# Patient Record
Sex: FEMALE | Race: WHITE | Employment: FULL TIME | URBAN - METROPOLITAN AREA
[De-identification: names, ages, dates, MRNs, and addresses within clinical notes are randomized per-mention and may not be internally consistent; named-entity substitution may affect disease eponyms.]

---

## 2020-11-18 ENCOUNTER — VIRTUAL VISIT (OUTPATIENT)
Dept: TRANSPLANT | Facility: CLINIC | Age: 60
End: 2020-11-18
Attending: GENETIC COUNSELOR, MS
Payer: COMMERCIAL

## 2020-11-18 DIAGNOSIS — Z84.81 FAMILY HISTORY OF GENETIC DISEASE CARRIER: Primary | ICD-10-CM

## 2020-11-18 PROCEDURE — 96040 HC GENETIC COUNSELING, EACH 30 MINUTES: CPT | Mod: GT | Performed by: GENETIC COUNSELOR, MS

## 2020-11-18 NOTE — PROGRESS NOTES
"Gricelda Levine is a 60 year old female who is being evaluated via a billable telephone visit.      The patient has been notified of following:     \"This telephone visit will be conducted via a call between you and your physician/provider. We have found that certain health care needs can be provided without the need for a physical exam.  This service lets us provide the care you need with a short phone conversation.  If a prescription is necessary we can send it directly to your pharmacy.  If lab work is needed we can place an order for that and you can then stop by our lab to have the test done at a later time.    Telephone visits are billed at different rates depending on your insurance coverage. During this emergency period, for some insurers they may be billed the same as an in-person visit.  Please reach out to your insurance provider with any questions.    If during the course of the call the physician/provider feels a telephone visit is not appropriate, you will not be charged for this service.\"    Patient has given verbal consent for Telephone visit?  YES    Phone call duration: 75 minutes    It was a pleasure meeting with Gricelda IrvinRachel) on 2020  in the [facility] to review the family history of X-linked adrenoleukodystrophy (X-ALD). I was accompanied to this visit by Samera Al-Najjar, genetic counseling intern.     Pertinent History: Gricelda's daughter, Angie, has a son, Joshua, who had a positive  screen for X-ALD. He had subsequent testing that included positive VLCFA studies and a variant of possible clinical significance (VUS) in the ABCD1 gene called c.1448C>T (p.[id number]). Angie was subsequently found to have the famililal variant and Gricelda's , Davis, by report had normal VLCFA studies. Based on this information, Gricelda has presented for genetic counseling to consider testing. Gricelda denied any symptoms associated with X-ALD.    X-ALD Overview: "     X-ALD is a genetic condition that can present with a variety of symptoms in different individuals affected with the disease. The most common forms of X-ALD include: (1) pre-symptomatic/asymptomatic, (2) cerebral disease, (3) adrenal insufficiency (Brazoria's disease), and (4) myelopathy/AMN.    Pre-symptomatic/asymptomatic: Some individuals who do not currently have symptoms are known to have X-ALD following a positive  screen or predictive genetic testing. These individuals are considered pre-symptomatic because they do not currently have symptoms.    Cerebral disease: This form is most common in boys between 3-12 years of age although cerebral disease can develop at other times in childhood or adulthood. Symptoms often begin with learning and behavioral challenges that worsen over time. Seizures can be the first symptom in some boys. Common symptoms as the disease progresses include vision & hearing problems, difficulties swallowing, and poor coordination. Untreated, individuals with the cerebral form of ALD typically develop worsening symptoms that eventually lead to full disability and death. Screening for early signs of cerebral disease with brain MRIs can help detect early signs of disease when more treatment options are available.     Adrenal insufficiency (Brazoria's disease): This form results from the adrenal glands not making enough of certain hormones. Some symptoms of adrenal insufficiency include weakness, weight loss, darkening patches of skin, vomiting, and coma. Adrenal insufficiency most commonly presents in childhood but can develop later in life. Boys and men with X-ALD are monitored for adrenal dysfunction so they can be treated if adrenal insufficiency develops.    Myelopathy/AMN: This form typically presents in adulthood and involves weakness and stiffness in the lower extremities (paraparesis). Other symptoms can include difficulties walking (gait disorder), bladder and bowel control  "issues, and sexual dysfunction. This form is sometimes referred to as adrenomyeloneuropathy (AMN).    Males with X-ALD generally develop one or multiple of these forms of X-ALD in their lifetime. Females with X-ALD (historically called \"carriers\") are more likely to have either no symptoms or develop myelopathy/AMN-like symptoms in adulthood (typically >30 years of age). Since cerebral disease and adrenal insufficiency are rare in females with X-ALD, girls and women with X-ALD are not routinely screened for these symptoms. Individuals with X-ALD, even in the same family, can have very different forms of X-ALD. Genetic testing, VLCFA studies, and family history cannot predict how someone with X-ALD will present in their lifetime.    X-ALD Genetics:     We all have DNA in every cell in our bodies that tells our bodies how to develop and function properly. Individual instructions in the DNA are called genes. Disease-causing variants (changes) in genes that stop the gene from working properly cause genetic diseases like X-ALD.    DNA is stored in structures called chromosomes. We all have 46 chromosomes that come in 23 pairs. The first 22 pairs of chromosomes are called autosomal and are the same in males and females. The 23rd pair of chromosomes are called sex chromosomes and are different in males and females. Male sex chromosomes are XY while female sex chromosomes are XX.     The gene associated with X-ALD is called ABCD1 which is located on the X chromosome. Normally, this gene provides the instructions for building a protein called adrenoleukodystrophy protein (ALDP). This protein helps to transport very long-chain fatty acids (VLCFA) into the peroxisomes. Peroxisomes are a part of the cell that breaks down and processes many things including VLCFA.     Disease-causing changes in the ABCD1 gene leads to ALDP not working properly. When ALDP doesn't work like it should, VLCFA aren't moved into the peroxisomes to be " broken down. This causes high levels of VLCFA in the body. This build up of VLCFA appears to harm different parts of the body including: (1) the coating called myelin that protects the nerves in the brain and spine and (2) the adrenal glands that are responsible for making certain hormones in the body.    X-ALD Inheritance:     Because women have two copies of the X chromosome, they have two copies of the ABCD1 gene. Men only have one X chromosome so men only have one copy of the ABCD1 gene. As a result, if one copy of a woman's ABCD1 gene has a change, she is most often either unaffected or has myelopathy/AMN-like symptoms associated with X-ALD. This is because she has another copy of the ABCD1 gene on the other X chromosome that still works properly.  On the other hand, almost all boys and men with X-ALD from a change in the ABCD1 gene develop some symptoms because they do not have an extra copy.       For males with X-ALD, they will pass the ABCD1 gene variant to all of their daughters and none of their sons.     For females with X-ALD, there is a 50% chance of passing on the ABCD1 gene variant in each pregnancy. Any son has a 50% chance of inheriting the ABCD1 gene variant and any daughter has a 50% chance of inheriting the ABCD1 gene variant.  o Based on the currently interpretation of the variant, our understanding of how this disease will present clinically for individuals in the family who inherit the familial variant is currently limited.    Consent for Genetic Testing:  After reviewing the risks, benefits, limitations, and potential for genetic discrimination associated with genetic testing, Gricelda elected to proceed with targeted testing for the familial variant in the ABCD1 gene through the [facility]. The sample will be drawn after insurance has been evaluated on Gricelda's behalf. Gricelda will be contacted by our insurance team to determine if she is comfortable  with her level of coverage. If Gricelda is comfortable with her level of coverage, testing will be initiated and Gricelda will be contacted with the results of her testing.     Testing for Family Members:  Gricelda gave permission for her medical and family history information to be shared with any of her extended family. She is hopeful that her results will help inform familial testing. We reviewed how, if she is found to have the familial variant, testing will be recommended for her extended family. If she is not found to have the variant, her extended family like her siblings/parents/neices and nephews would not need to complete testing. In this scenario, her children could still have a small chance of having the familial variant due to potential germline mosaicism and testing would still be offered to her children/grandchildren. Additional testing could also be completed on Gricelda's  to confirm that he does not have the familial variant as needed. The family is working to send a copy of Davis's VLCFA results for our records.    Plan:  1. The family history was reviewed today.  2. The genetics and inheritance of X-ALD were reviewed.  3. After reviewing the risks, benefits, limitations, and potential for genetic discrimination, Gricelda verbally consented to targeted ABCD1 gene testing through the [facility]. Insurance will be evaluated on her behalf and Gricelda will be contacted with the outcome of this review to determine if she would like to proceed with testing. Results will be provided over the phone.  5. A verbal release of information was completed for family communication of Gricelda's PHI and for email communication of PHI.  6. Contact information was provided. Additional questions or concerns were denied.     Sincerely,    Paola Bustamante MS, MA, [facility]  Licensed, Certified Genetic Counselor  Pediatric Blood & Marrow Transplant  [phone]  [email or web address]    Approximate time spent in consultation: 75 minutes

## 2020-11-18 NOTE — LETTER
"2020      RE: Gricelad Levine  [address] [address] [facility] [id number]       Gricelda Levine is a 60 year old female who is being evaluated via a billable telephone visit.      The patient has been notified of following:     \"This telephone visit will be conducted via a call between you and your physician/provider. We have found that certain health care needs can be provided without the need for a physical exam.  This service lets us provide the care you need with a short phone conversation.  If a prescription is necessary we can send it directly to your pharmacy.  If lab work is needed we can place an order for that and you can then stop by our lab to have the test done at a later time.    Telephone visits are billed at different rates depending on your insurance coverage. During this emergency period, for some insurers they may be billed the same as an in-person visit.  Please reach out to your insurance provider with any questions.    If during the course of the call the physician/provider feels a telephone visit is not appropriate, you will not be charged for this service.\"    Patient has given verbal consent for Telephone visit?  YES    Phone call duration: 75 minutes    It was a pleasure meeting with Gricelda Fonseca) on 2020  in the [facility] to review the family history of X-linked adrenoleukodystrophy (X-ALD). I was accompanied to this visit by Samera Al-Najjar, genetic counseling intern.     Pertinent History: Gricelda's daughter, Angie, has a son, Joshua, who had a positive  screen for X-ALD. He had subsequent testing that included positive VLCFA studies and a variant of possible clinical significance (VUS) in the ABCD1 gene called c.1448C>T (p.[id number]). Angie was subsequently found to have the famililal variant and Gricelda's , Davis, by report had normal VLCFA studies. Based on this information, Gricelda has presented for genetic counseling to " consider testing. Gricelda denied any symptoms associated with X-ALD.    X-ALD Overview:     X-ALD is a genetic condition that can present with a variety of symptoms in different individuals affected with the disease. The most common forms of X-ALD include: (1) pre-symptomatic/asymptomatic, (2) cerebral disease, (3) adrenal insufficiency ([place]ct[place]'s disease), and (4) myelopathy/AMN.    Pre-symptomatic/asymptomatic: Some individuals who do not currently have symptoms are known to have X-ALD following a positive  screen or predictive genetic testing. These individuals are considered pre-symptomatic because they do not currently have symptoms.    Cerebral disease: This form is most common in boys between 3-12 years of age although cerebral disease can develop at other times in childhood or adulthood. Symptoms often begin with learning and behavioral challenges that worsen over time. Seizures can be the first symptom in some boys. Common symptoms as the disease progresses include vision & hearing problems, difficulties swallowing, and poor coordination. Untreated, individuals with the cerebral form of ALD typically develop worsening symptoms that eventually lead to full disability and death. Screening for early signs of cerebral disease with brain MRIs can help detect early signs of disease when more treatment options are available.     Adrenal insufficiency (Oliver's disease): This form results from the adrenal glands not making enough of certain hormones. Some symptoms of adrenal insufficiency include weakness, weight loss, darkening patches of skin, vomiting, and coma. Adrenal insufficiency most commonly presents in childhood but can develop later in life. Boys and men with X-ALD are monitored for adrenal dysfunction so they can be treated if adrenal insufficiency develops.    Myelopathy/AMN: This form typically presents in adulthood and involves weakness and stiffness in the lower extremities (paraparesis).  "Other symptoms can include difficulties walking (gait disorder), bladder and bowel control issues, and sexual dysfunction. This form is sometimes referred to as adrenomyeloneuropathy (AMN).    Males with X-ALD generally develop one or multiple of these forms of X-ALD in their lifetime. Females with X-ALD (historically called \"carriers\") are more likely to have either no symptoms or develop myelopathy/AMN-like symptoms in adulthood (typically >30 years of age). Since cerebral disease and adrenal insufficiency are rare in females with X-ALD, girls and women with X-ALD are not routinely screened for these symptoms. Individuals with X-ALD, even in the same family, can have very different forms of X-ALD. Genetic testing, VLCFA studies, and family history cannot predict how someone with X-ALD will present in their lifetime.    X-ALD Genetics:     We all have DNA in every cell in our bodies that tells our bodies how to develop and function properly. Individual instructions in the DNA are called genes. Disease-causing variants (changes) in genes that stop the gene from working properly cause genetic diseases like X-ALD.    DNA is stored in structures called chromosomes. We all have 46 chromosomes that come in 23 pairs. The first 22 pairs of chromosomes are called autosomal and are the same in males and females. The 23rd pair of chromosomes are called sex chromosomes and are different in males and females. Male sex chromosomes are XY while female sex chromosomes are XX.     The gene associated with X-ALD is called ABCD1 which is located on the X chromosome. Normally, this gene provides the instructions for building a protein called adrenoleukodystrophy protein (ALDP). This protein helps to transport very long-chain fatty acids (VLCFA) into the peroxisomes. Peroxisomes are a part of the cell that breaks down and processes many things including VLCFA.     Disease-causing changes in the ABCD1 gene leads to ALDP not working " properly. When ALDP doesn't work like it should, VLCFA aren't moved into the peroxisomes to be broken down. This causes high levels of VLCFA in the body. This build up of VLCFA appears to harm different parts of the body including: (1) the coating called myelin that protects the nerves in the brain and spine and (2) the adrenal glands that are responsible for making certain hormones in the body.    X-ALD Inheritance:     Because women have two copies of the X chromosome, they have two copies of the ABCD1 gene. Men only have one X chromosome so men only have one copy of the ABCD1 gene. As a result, if one copy of a woman's ABCD1 gene has a change, she is most often either unaffected or has myelopathy/AMN-like symptoms associated with X-ALD. This is because she has another copy of the ABCD1 gene on the other X chromosome that still works properly.  On the other hand, almost all boys and men with X-ALD from a change in the ABCD1 gene develop some symptoms because they do not have an extra copy.       For males with X-ALD, they will pass the ABCD1 gene variant to all of their daughters and none of their sons.     For females with X-ALD, there is a 50% chance of passing on the ABCD1 gene variant in each pregnancy. Any son has a 50% chance of inheriting the ABCD1 gene variant and any daughter has a 50% chance of inheriting the ABCD1 gene variant.  o Based on the currently interpretation of the variant, our understanding of how this disease will present clinically for individuals in the family who inherit the familial variant is currently limited.    Consent for Genetic Testing:  After reviewing the risks, benefits, limitations, and potential for genetic discrimination associated with genetic testing, Gricelda elected to proceed with targeted testing for the familial variant in the ABCD1 gene through the [facility]. The sample will be drawn after insurance has been evaluated on  Gricelda's behalf. Gricelda will be contacted by our insurance team to determine if she is comfortable with her level of coverage. If Gricelda is comfortable with her level of coverage, testing will be initiated and Gricelda will be contacted with the results of her testing.     Testing for Family Members:  Gricelda gave permission for her medical and family history information to be shared with any of her extended family. She is hopeful that her results will help inform familial testing. We reviewed how, if she is found to have the familial variant, testing will be recommended for her extended family. If she is not found to have the variant, her extended family like her siblings/parents/neices and nephews would not need to complete testing. In this scenario, her children could still have a small chance of having the familial variant due to potential germline mosaicism and testing would still be offered to her children/grandchildren. Additional testing could also be completed on Gricelda's  to confirm that he does not have the familial variant as needed. The family is working to send a copy of Davis's VLCFA results for our records.    Plan:  1. The family history was reviewed today.  2. The genetics and inheritance of X-ALD were reviewed.  3. After reviewing the risks, benefits, limitations, and potential for genetic discrimination, Gricelda verbally consented to targeted ABCD1 gene testing through the [facility]. Insurance will be evaluated on her behalf and Gricelda will be contacted with the outcome of this review to determine if she would like to proceed with testing. Results will be provided over the phone.  5. A verbal release of information was completed for family communication of Gricelda's PHI and for email communication of PHI.  6. Contact information was provided. Additional questions or concerns were denied.     Sincerely,    Paola Bustamante MS, MA, [facility]  Licensed, Certified  Genetic Counselor  Pediatric Blood & Marrow Transplant  [phone]  [email or web address]    Approximate time spent in consultation: 75 minutes

## 2020-11-30 ENCOUNTER — TELEPHONE (OUTPATIENT)
Dept: CONSULT | Facility: CLINIC | Age: 60
End: 2020-11-30

## 2020-11-30 NOTE — TELEPHONE ENCOUNTER
I called Gricelda to provide options on where she can get drawn for her genetic testing. Gricelda will schedule an appointment at the [facility]. We will call when results are available.       FRAN Wilcox   Genomics Billing   [facility]  [facility]  [address] [place] [id number]  [email or web address]   Office: [phone]  Fax: [phone]

## 2020-12-10 DIAGNOSIS — Z84.81 FAMILY HISTORY OF GENETIC DISEASE CARRIER: ICD-10-CM

## 2020-12-10 PROCEDURE — 99N1104 PR STATISTIC DNA ISOL HIGH PURITY: Performed by: PEDIATRICS

## 2020-12-11 DIAGNOSIS — Z84.81 FAMILY HISTORY OF GENETIC DISEASE CARRIER: Primary | ICD-10-CM

## 2020-12-11 LAB — COPATH REPORT: NORMAL

## 2020-12-11 PROCEDURE — 81405 MOPATH PROCEDURE LEVEL 6: CPT | Performed by: PEDIATRICS

## 2020-12-11 PROCEDURE — G0452 MOLECULAR PATHOLOGY INTERPR: HCPCS | Mod: 26 | Performed by: PATHOLOGY

## 2020-12-11 PROCEDURE — 81479 UNLISTED MOLECULAR PATHOLOGY: CPT | Performed by: PEDIATRICS

## 2020-12-18 ENCOUNTER — TELEPHONE (OUTPATIENT)
Dept: TRANSPLANT | Facility: CLINIC | Age: 60
End: 2020-12-18

## 2020-12-18 LAB — COPATH REPORT: NORMAL

## 2020-12-18 NOTE — Clinical Note
Attn: HIMS  Please print and send a copy of this letter and result to the patient at the home address.  Thank you!  Paola

## 2020-12-18 NOTE — LETTER
2020       TO: Gricelda Levine  [id number] [facility] [address]         Dear Gricelda,    It was a pleasure speaking with you on 2020 regarding the results of your genetic studies. Below is a summary of our discussion for your records:    Pertinent Family History: Previously, your grandson had a positive [place]  screen for X-ALD. Additional studies including VLCFA studies and ABCD1 gene testing were most consistent with your grandson having X-ALD. At your last visit, you elected to proceed with targeted testing for the familial ABCD1 gene variant.     ABCD1 Gene Testing:  Genetic testing targeting the familial ABCD1 gene variant called c.1448C>T (p.Csl935Qcy) was sent to the [facility]. The results were as follows:    RESULTS: Positive. 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    Gricelda, you were found to have the familial c.1448C>T (p.Dkc800Sgb) variant (change) in the ABCD1 gene. 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 Additional testing and symptom evaluation in family members may help increase the laboratory's confidence in the interpretation of this variant. It is important to reach out annually for any updates on this variant.    X-ALD Overview:   X-ALD is a genetic condition that can present with a variety of symptoms in different individuals affected with the disease. The most common forms of X-ALD include: (1) pre-symptomatic/asymptomatic, (2) cerebral disease, (3) adrenal insufficiency (Dawson's disease), and (4) myelopathy/AMN.    Pre-symptomatic/asymptomatic: Some individuals who do not currently have symptoms are known to have X-ALD following a positive   "screen or predictive genetic testing. These individuals are considered pre-symptomatic because they do not currently have symptoms.    Cerebral disease: This form is most common in boys between 3-12 years of age although cerebral disease can develop at other times in childhood or adulthood. Symptoms often begin with learning and behavioral challenges that worsen over time. Seizures can be the first symptom in some boys. Common symptoms as the disease progresses include vision & hearing problems, difficulties swallowing, and poor coordination. Untreated, individuals with the cerebral form of ALD typically develop worsening symptoms that eventually lead to full disability and death. Screening for early signs of cerebral disease with brain MRIs can help detect early signs of disease when more treatment options are available.     Adrenal insufficiency (Craighead's disease): This form results from the adrenal glands not making enough of certain hormones. Some symptoms of adrenal insufficiency include weakness, weight loss, darkening patches of skin, vomiting, and coma. Adrenal insufficiency most commonly presents in childhood but can develop later in life. Boys and men with X-ALD are monitored for adrenal dysfunction so they can be treated if adrenal insufficiency develops.    Myelopathy/AMN: This form typically presents in adulthood and involves weakness and stiffness in the lower extremities (paraparesis). Other symptoms can include difficulties walking (gait disorder), bladder and bowel control issues, and sexual dysfunction. This form is sometimes referred to as adrenomyeloneuropathy (AMN).    Males with X-ALD generally develop one or multiple of these forms of X-ALD in their lifetime. Females with X-ALD (historically called \"carriers\") are more likely to have either no symptoms or develop myelopathy/AMN-like symptoms in adulthood (typically >30 years of age). Since cerebral disease and adrenal insufficiency are rare " in females with X-ALD, girls and women with X-ALD are not routinely screened for these symptoms. Individuals with X-ALD, even in the same family, can have very different forms of X-ALD. Genetic testing, VLCFA studies, and family history cannot predict how someone with X-ALD will present in their lifetime.    X-ALD Genetics:  We all have DNA in every cell in our bodies that tells our bodies how to develop and function properly. Individual instructions in the DNA are called genes. Disease-causing variants (changes) in genes that stop the gene from working properly cause genetic diseases like X-ALD.    DNA is stored in structures called chromosomes. We all have 46 chromosomes that come in 23 pairs. The first 22 pairs of chromosomes are called autosomal and are the same in males and females. The 23rd pair of chromosomes are called sex chromosomes and are different in males and females. Male sex chromosomes are XY while female sex chromosomes are XX.     The gene associated with X-ALD is called ABCD1 which is located on the X chromosome. Normally, this gene provides the instructions for building a protein called adrenoleukodystrophy protein (ALDP). This protein helps to transport very long-chain fatty acids (VLCFA) into the peroxisomes. Peroxisomes are a part of the cell that breaks down and processes many things including VLCFA.     Disease-causing changes in the ABCD1 gene leads to ALDP not working properly. When ALDP doesn't work like it should, VLCFA aren't moved into the peroxisomes to be broken down. This causes high levels of VLCFA in the body. This build up of VLCFA appears to harm different parts of the body including: (1) the coating called myelin that protects the nerves in the brain and spine and (2) the adrenal glands that are responsible for making certain hormones in the body.    X-ALD Inheritance:   Because women have two copies of the X chromosome, they have two copies of the ABCD1 gene. Men only have  one X chromosome so men only have one copy of the ABCD1 gene. As a result, if one copy of a woman's ABCD1 gene has a change, she is most often either unaffected or has myelopathy/AMN-like symptoms associated with X-ALD. This is because she has another copy of the ABCD1 gene on the other X chromosome that still works properly. On the other hand, almost all boys and men with X-ALD from a change in the ABCD1 gene develop some symptoms because they do not have an extra copy.       For males with X-ALD, they will pass the ABCD1 gene variant to all of their daughters and none of their sons.     For females with X-ALD, there is a 50% chance of passing on the ABCD1 gene variant in each pregnancy. Any son has a 50% chance of inheriting the ABCD1 gene variant and any daughter has a 50% chance of inheriting the ABCD1 gene variant.  o Since your testing was positive for the ABCD1 gene variant that was found in your grandson and his mother, this is the chance your other children would have X-ALD. Based on the interpretation of the familial variant, our understanding of how this disease will present clinically for individuals in the family who inherit the familial variant is currently limited.    Testing for Family Members:   We also reviewed testing for other family members. Males in the family can be tested via VLCFA analysis. Up to 20% of women with X-ALD will have a false negative result from VLCFA studies. Because of this, female relatives of individuals with X-ALD are generally  advised to complete testing for the specific ABCD1 gene variant that has been identified in the family. Female relatives can complete testing for the familial variant that was initially identified through your grandson's testing. A genetic counselor can help facilitate this testing. Relatives can find a local genetic counselor through the [employer] website. Based on the genetic findings in the family, genetic counseling is highly  "recommended for relatives pursuing genetic testing so that they can discuss the current limitations to our understanding of the familial variant. An updated family letter was provided to assist with familial testing.    It was a pleasure speaking with you regarding these results. Please find a copy of your test results enclosed for your records. The above information is based on our current understanding of the genetic findings in your family. You are encouraged to reach out annually for any updates to your family's genetic testing information as our understanding of the genetic findings in your family may change over time. If you have any additional questions or concerns, please do not hesitate to call me at [phone].    Sincerely,    Paola Bustamante MS, MA, [facility]  Licensed, Certified Genetic Counselor  Pediatric Blood & Marrow Transplant  [phone]  [email or web address]    ENCLOSURE: Please include a copy of Gricelda's results under the \"Laboratory\" tab titled \"next generation sequencing\" from 12/11/2020.                                              "

## 2021-03-18 ENCOUNTER — IMMUNIZATION (OUTPATIENT)
Dept: NURSING | Facility: CLINIC | Age: 61
End: 2021-03-18
Payer: COMMERCIAL

## 2021-03-18 PROCEDURE — 91300 PR COVID VAC PFIZER DIL RECON 30 MCG/0.3 ML IM: CPT

## 2021-03-18 PROCEDURE — 0001A PR COVID VAC PFIZER DIL RECON 30 MCG/0.3 ML IM: CPT

## 2021-04-08 ENCOUNTER — IMMUNIZATION (OUTPATIENT)
Dept: NURSING | Facility: CLINIC | Age: 61
End: 2021-04-08
Attending: FAMILY MEDICINE
Payer: COMMERCIAL

## 2021-04-08 PROCEDURE — 91300 PR COVID VAC PFIZER DIL RECON 30 MCG/0.3 ML IM: CPT

## 2021-04-08 PROCEDURE — 0002A PR COVID VAC PFIZER DIL RECON 30 MCG/0.3 ML IM: CPT

## 2021-05-01 ENCOUNTER — HEALTH MAINTENANCE LETTER (OUTPATIENT)
Age: 61
End: 2021-05-01

## 2021-10-11 ENCOUNTER — HEALTH MAINTENANCE LETTER (OUTPATIENT)
Age: 61
End: 2021-10-11

## 2022-05-22 ENCOUNTER — HEALTH MAINTENANCE LETTER (OUTPATIENT)
Age: 62
End: 2022-05-22

## 2022-09-24 ENCOUNTER — HEALTH MAINTENANCE LETTER (OUTPATIENT)
Age: 62
End: 2022-09-24

## 2023-01-29 ENCOUNTER — HEALTH MAINTENANCE LETTER (OUTPATIENT)
Age: 63
End: 2023-01-29

## 2023-06-04 ENCOUNTER — HEALTH MAINTENANCE LETTER (OUTPATIENT)
Age: 63
End: 2023-06-04

## 2024-04-16 ENCOUNTER — TRANSFERRED RECORDS (OUTPATIENT)
Dept: MULTI SPECIALTY CLINIC | Facility: CLINIC | Age: 64
End: 2024-04-16

## 2024-04-16 LAB
CREATININE (EXTERNAL): 0.81 MG/DL (ref 0.55–1.02)
GFR ESTIMATED (EXTERNAL): >60 ML/MIN/1.73M2
GLUCOSE (EXTERNAL): 110 MG/DL (ref 70–100)
HBA1C MFR BLD: 5.6 %
POTASSIUM (EXTERNAL): 4.2 MMOL/L (ref 3.5–5.1)

## 2024-04-17 RX ORDER — NAPROXEN SODIUM 220 MG/1
220 TABLET, FILM COATED ORAL 2 TIMES DAILY WITH MEALS
Status: ON HOLD | COMMUNITY
End: 2024-05-04

## 2024-04-17 RX ORDER — MULTIVITAMIN
1 TABLET ORAL DAILY
COMMUNITY

## 2024-04-17 RX ORDER — ACETAMINOPHEN 500 MG
500-1000 TABLET ORAL EVERY 6 HOURS PRN
COMMUNITY

## 2024-04-30 RX ORDER — IBUPROFEN 400 MG/1
400 TABLET, FILM COATED ORAL EVERY 6 HOURS PRN
Status: ON HOLD | COMMUNITY
End: 2024-05-04

## 2024-05-03 ENCOUNTER — ANESTHESIA (OUTPATIENT)
Dept: SURGERY | Facility: CLINIC | Age: 64
End: 2024-05-03
Payer: COMMERCIAL

## 2024-05-03 ENCOUNTER — APPOINTMENT (OUTPATIENT)
Dept: RADIOLOGY | Facility: CLINIC | Age: 64
End: 2024-05-03
Attending: ORTHOPAEDIC SURGERY
Payer: COMMERCIAL

## 2024-05-03 ENCOUNTER — ANCILLARY PROCEDURE (OUTPATIENT)
Dept: ULTRASOUND IMAGING | Facility: CLINIC | Age: 64
End: 2024-05-03
Attending: ANESTHESIOLOGY
Payer: COMMERCIAL

## 2024-05-03 ENCOUNTER — HOSPITAL ENCOUNTER (INPATIENT)
Facility: CLINIC | Age: 64
LOS: 2 days | Discharge: HOME | End: 2024-05-05
Attending: ORTHOPAEDIC SURGERY | Admitting: ORTHOPAEDIC SURGERY
Payer: COMMERCIAL

## 2024-05-03 ENCOUNTER — ANESTHESIA EVENT (OUTPATIENT)
Dept: SURGERY | Facility: CLINIC | Age: 64
End: 2024-05-03
Payer: COMMERCIAL

## 2024-05-03 DIAGNOSIS — Z98.1 S/P LUMBAR AND LUMBOSACRAL FUSION BY ANTERIOR TECHNIQUE: Primary | ICD-10-CM

## 2024-05-03 PROCEDURE — 272N000004 HC RX 272: Performed by: ORTHOPAEDIC SURGERY

## 2024-05-03 PROCEDURE — 0SG30A0 FUSION OF LUMBOSACRAL JOINT WITH INTERBODY FUSION DEVICE, ANTERIOR APPROACH, ANTERIOR COLUMN, OPEN APPROACH: ICD-10-PCS | Performed by: ORTHOPAEDIC SURGERY

## 2024-05-03 PROCEDURE — 360N000086 HC SURGERY LEVEL 6 W/ FLUORO, PER MIN: Performed by: ORTHOPAEDIC SURGERY

## 2024-05-03 PROCEDURE — 250N000025 HC SEVOFLURANE, PER MIN: Performed by: ORTHOPAEDIC SURGERY

## 2024-05-03 PROCEDURE — 272N000001 HC OR GENERAL SUPPLY STERILE: Performed by: ORTHOPAEDIC SURGERY

## 2024-05-03 PROCEDURE — 8E0WXBZ COMPUTER ASSISTED PROCEDURE OF TRUNK REGION: ICD-10-PCS | Performed by: ORTHOPAEDIC SURGERY

## 2024-05-03 PROCEDURE — 250N000013 HC RX MED GY IP 250 OP 250 PS 637: Performed by: ORTHOPAEDIC SURGERY

## 2024-05-03 PROCEDURE — 0ST40ZZ RESECTION OF LUMBOSACRAL DISC, OPEN APPROACH: ICD-10-PCS | Performed by: ORTHOPAEDIC SURGERY

## 2024-05-03 PROCEDURE — 999N000141 HC STATISTIC PRE-PROCEDURE NURSING ASSESSMENT: Performed by: ORTHOPAEDIC SURGERY

## 2024-05-03 PROCEDURE — C1762 CONN TISS, HUMAN(INC FASCIA): HCPCS | Performed by: ORTHOPAEDIC SURGERY

## 2024-05-03 PROCEDURE — 999N000182 XR SURGERY CARM FLUORO GREATER THAN 5 MIN: Mod: TC

## 2024-05-03 PROCEDURE — 250N000011 HC RX IP 250 OP 636: Performed by: ANESTHESIOLOGY

## 2024-05-03 PROCEDURE — 999N000157 HC STATISTIC RCP TIME EA 10 MIN

## 2024-05-03 PROCEDURE — 250N000011 HC RX IP 250 OP 636: Performed by: ORTHOPAEDIC SURGERY

## 2024-05-03 PROCEDURE — 258N000003 HC RX IP 258 OP 636: Performed by: NURSE ANESTHETIST, CERTIFIED REGISTERED

## 2024-05-03 PROCEDURE — 250N000009 HC RX 250: Performed by: ORTHOPAEDIC SURGERY

## 2024-05-03 PROCEDURE — 250N000011 HC RX IP 250 OP 636: Performed by: NURSE ANESTHETIST, CERTIFIED REGISTERED

## 2024-05-03 PROCEDURE — C1713 ANCHOR/SCREW BN/BN,TIS/BN: HCPCS | Performed by: ORTHOPAEDIC SURGERY

## 2024-05-03 PROCEDURE — 120N000001 HC R&B MED SURG/OB

## 2024-05-03 PROCEDURE — 710N000010 HC RECOVERY PHASE 1, LEVEL 2, PER MIN: Performed by: ORTHOPAEDIC SURGERY

## 2024-05-03 PROCEDURE — 258N000003 HC RX IP 258 OP 636: Performed by: ANESTHESIOLOGY

## 2024-05-03 PROCEDURE — 250N000009 HC RX 250: Performed by: ANESTHESIOLOGY

## 2024-05-03 PROCEDURE — 250N000009 HC RX 250: Performed by: NURSE ANESTHETIST, CERTIFIED REGISTERED

## 2024-05-03 PROCEDURE — 258N000003 HC RX IP 258 OP 636: Performed by: ORTHOPAEDIC SURGERY

## 2024-05-03 PROCEDURE — 0SG3071 FUSION OF LUMBOSACRAL JOINT WITH AUTOLOGOUS TISSUE SUBSTITUTE, POSTERIOR APPROACH, POSTERIOR COLUMN, OPEN APPROACH: ICD-10-PCS | Performed by: ORTHOPAEDIC SURGERY

## 2024-05-03 PROCEDURE — 250N000005 HC OR RX SURGIFLO HEMOSTATIC MATRIX 10ML 199102S OPNP: Performed by: ORTHOPAEDIC SURGERY

## 2024-05-03 PROCEDURE — 999N000063 XR ABDOMEN PORT 1 VIEW

## 2024-05-03 PROCEDURE — 370N000017 HC ANESTHESIA TECHNICAL FEE, PER MIN: Performed by: ORTHOPAEDIC SURGERY

## 2024-05-03 PROCEDURE — G0378 HOSPITAL OBSERVATION PER HR: HCPCS

## 2024-05-03 PROCEDURE — C9290 INJ, BUPIVACAINE LIPOSOME: HCPCS | Performed by: ANESTHESIOLOGY

## 2024-05-03 PROCEDURE — 01NB0ZZ RELEASE LUMBAR NERVE, OPEN APPROACH: ICD-10-PCS | Performed by: ORTHOPAEDIC SURGERY

## 2024-05-03 DEVICE — IMP ROD MEDT 3.5CM 1475501035: Type: IMPLANTABLE DEVICE | Site: SPINE LUMBAR | Status: FUNCTIONAL

## 2024-05-03 DEVICE — BONE SCREW 4675025 LS 5.0X25MMT
Type: IMPLANTABLE DEVICE | Site: ABDOMEN | Status: FUNCTIONAL
Brand: ANTERALIGN SPINAL SYSTEM WITH TITAN NANOLOCK SURFACE TECHNOLOGY

## 2024-05-03 DEVICE — IMP SCR MEDT 4.75MM SOLERA 6.5X45MM MA 54840006545: Type: IMPLANTABLE DEVICE | Site: SPINE LUMBAR | Status: FUNCTIONAL

## 2024-05-03 DEVICE — IMP SCR MEDT BREAK-OFF SET SOLERA 4.75MM TI 5440030: Type: IMPLANTABLE DEVICE | Site: SPINE LUMBAR | Status: FUNCTIONAL

## 2024-05-03 DEVICE — IMP SCR MEDT 4.75MM SOLERA 6.5X40MM MA 54840006540: Type: IMPLANTABLE DEVICE | Site: SPINE LUMBAR | Status: FUNCTIONAL

## 2024-05-03 DEVICE — GRAFT BN CANC 15CC CRSH 1-10MM 800103: Type: IMPLANTABLE DEVICE | Site: ABDOMEN | Status: FUNCTIONAL

## 2024-05-03 DEVICE — MAGNETOS PUTTY 10CC 1-2MM USA
Type: IMPLANTABLE DEVICE | Site: ABDOMEN | Status: FUNCTIONAL
Brand: MAGNETOS

## 2024-05-03 DEVICE — IMP SCR MEDT 4.75MM SOLERA 6.5X50MM MA 54840006550: Type: IMPLANTABLE DEVICE | Site: SPINE LUMBAR | Status: FUNCTIONAL

## 2024-05-03 RX ORDER — HYDROXYZINE HYDROCHLORIDE 50 MG/ML
25 INJECTION, SOLUTION INTRAMUSCULAR EVERY 4 HOURS PRN
Status: DISCONTINUED | OUTPATIENT
Start: 2024-05-03 | End: 2024-05-05 | Stop reason: HOSPADM

## 2024-05-03 RX ORDER — VANCOMYCIN HYDROCHLORIDE 1 G/20ML
1 INJECTION, POWDER, LYOPHILIZED, FOR SOLUTION INTRAVENOUS ONCE
Status: COMPLETED | OUTPATIENT
Start: 2024-05-03 | End: 2024-05-03

## 2024-05-03 RX ORDER — NALOXONE HYDROCHLORIDE 0.4 MG/ML
0.4 INJECTION, SOLUTION INTRAMUSCULAR; INTRAVENOUS; SUBCUTANEOUS
Status: DISCONTINUED | OUTPATIENT
Start: 2024-05-03 | End: 2024-05-05 | Stop reason: HOSPADM

## 2024-05-03 RX ORDER — ACETAMINOPHEN 325 MG/1
975 TABLET ORAL ONCE
Status: COMPLETED | OUTPATIENT
Start: 2024-05-03 | End: 2024-05-03

## 2024-05-03 RX ORDER — EPHEDRINE SULFATE 50 MG/ML
INJECTION, SOLUTION INTRAMUSCULAR; INTRAVENOUS; SUBCUTANEOUS PRN
Status: DISCONTINUED | OUTPATIENT
Start: 2024-05-03 | End: 2024-05-03

## 2024-05-03 RX ORDER — ONDANSETRON 2 MG/ML
4 INJECTION INTRAMUSCULAR; INTRAVENOUS EVERY 30 MIN PRN
Status: DISCONTINUED | OUTPATIENT
Start: 2024-05-03 | End: 2024-05-03 | Stop reason: HOSPADM

## 2024-05-03 RX ORDER — CEFAZOLIN SODIUM 2 G/100ML
2 INJECTION, SOLUTION INTRAVENOUS EVERY 8 HOURS
Qty: 200 ML | Refills: 0 | Status: COMPLETED | OUTPATIENT
Start: 2024-05-03 | End: 2024-05-04

## 2024-05-03 RX ORDER — PROPOFOL 10 MG/ML
INJECTION, EMULSION INTRAVENOUS CONTINUOUS PRN
Status: DISCONTINUED | OUTPATIENT
Start: 2024-05-03 | End: 2024-05-03

## 2024-05-03 RX ORDER — SODIUM CHLORIDE 9 MG/ML
INJECTION, SOLUTION INTRAVENOUS CONTINUOUS
Status: DISCONTINUED | OUTPATIENT
Start: 2024-05-03 | End: 2024-05-05 | Stop reason: HOSPADM

## 2024-05-03 RX ORDER — HYDROMORPHONE HCL IN WATER/PF 6 MG/30 ML
0.4 PATIENT CONTROLLED ANALGESIA SYRINGE INTRAVENOUS EVERY 5 MIN PRN
Status: DISCONTINUED | OUTPATIENT
Start: 2024-05-03 | End: 2024-05-03 | Stop reason: HOSPADM

## 2024-05-03 RX ORDER — LORATADINE 10 MG/1
10 TABLET ORAL DAILY PRN
Status: DISCONTINUED | OUTPATIENT
Start: 2024-05-03 | End: 2024-05-05 | Stop reason: HOSPADM

## 2024-05-03 RX ORDER — ACETAMINOPHEN 325 MG/1
975 TABLET ORAL EVERY 8 HOURS
Status: DISCONTINUED | OUTPATIENT
Start: 2024-05-03 | End: 2024-05-05 | Stop reason: HOSPADM

## 2024-05-03 RX ORDER — LIDOCAINE 40 MG/G
CREAM TOPICAL
Status: CANCELLED | OUTPATIENT
Start: 2024-05-03

## 2024-05-03 RX ORDER — BISACODYL 10 MG
10 SUPPOSITORY, RECTAL RECTAL DAILY PRN
Status: DISCONTINUED | OUTPATIENT
Start: 2024-05-06 | End: 2024-05-05 | Stop reason: HOSPADM

## 2024-05-03 RX ORDER — FENTANYL CITRATE 50 UG/ML
25 INJECTION, SOLUTION INTRAMUSCULAR; INTRAVENOUS EVERY 5 MIN PRN
Status: DISCONTINUED | OUTPATIENT
Start: 2024-05-03 | End: 2024-05-03 | Stop reason: HOSPADM

## 2024-05-03 RX ORDER — DEXAMETHASONE SODIUM PHOSPHATE 4 MG/ML
4 INJECTION, SOLUTION INTRA-ARTICULAR; INTRALESIONAL; INTRAMUSCULAR; INTRAVENOUS; SOFT TISSUE
Status: DISCONTINUED | OUTPATIENT
Start: 2024-05-03 | End: 2024-05-03 | Stop reason: HOSPADM

## 2024-05-03 RX ORDER — THERA TABS 400 MCG
1 TAB ORAL DAILY
Status: DISCONTINUED | OUTPATIENT
Start: 2024-05-04 | End: 2024-05-05 | Stop reason: HOSPADM

## 2024-05-03 RX ORDER — MAGNESIUM SULFATE 4 G/50ML
4 INJECTION INTRAVENOUS ONCE
Status: COMPLETED | OUTPATIENT
Start: 2024-05-03 | End: 2024-05-03

## 2024-05-03 RX ORDER — OXYCODONE HYDROCHLORIDE 5 MG/1
10 TABLET ORAL EVERY 4 HOURS PRN
Status: DISCONTINUED | OUTPATIENT
Start: 2024-05-03 | End: 2024-05-05 | Stop reason: HOSPADM

## 2024-05-03 RX ORDER — HYDROMORPHONE HCL IN WATER/PF 6 MG/30 ML
0.2 PATIENT CONTROLLED ANALGESIA SYRINGE INTRAVENOUS EVERY 5 MIN PRN
Status: DISCONTINUED | OUTPATIENT
Start: 2024-05-03 | End: 2024-05-03 | Stop reason: HOSPADM

## 2024-05-03 RX ORDER — METHADONE HYDROCHLORIDE 10 MG/ML
0.2 INJECTION, SOLUTION INTRAMUSCULAR; INTRAVENOUS; SUBCUTANEOUS ONCE
Status: DISCONTINUED | OUTPATIENT
Start: 2024-05-03 | End: 2024-05-03

## 2024-05-03 RX ORDER — ONDANSETRON 2 MG/ML
4 INJECTION INTRAMUSCULAR; INTRAVENOUS EVERY 6 HOURS PRN
Status: DISCONTINUED | OUTPATIENT
Start: 2024-05-03 | End: 2024-05-05 | Stop reason: HOSPADM

## 2024-05-03 RX ORDER — CYCLOBENZAPRINE HCL 10 MG
10 TABLET ORAL EVERY 8 HOURS PRN
Status: DISCONTINUED | OUTPATIENT
Start: 2024-05-03 | End: 2024-05-05 | Stop reason: HOSPADM

## 2024-05-03 RX ORDER — HYDROMORPHONE HCL IN WATER/PF 6 MG/30 ML
0.4 PATIENT CONTROLLED ANALGESIA SYRINGE INTRAVENOUS
Status: DISCONTINUED | OUTPATIENT
Start: 2024-05-03 | End: 2024-05-05 | Stop reason: HOSPADM

## 2024-05-03 RX ORDER — BUPIVACAINE HYDROCHLORIDE 2.5 MG/ML
INJECTION, SOLUTION EPIDURAL; INFILTRATION; INTRACAUDAL
Status: COMPLETED | OUTPATIENT
Start: 2024-05-03 | End: 2024-05-03

## 2024-05-03 RX ORDER — BUPIVACAINE HYDROCHLORIDE 2.5 MG/ML
INJECTION, SOLUTION INFILTRATION; PERINEURAL PRN
Status: DISCONTINUED | OUTPATIENT
Start: 2024-05-03 | End: 2024-05-03 | Stop reason: HOSPADM

## 2024-05-03 RX ORDER — ONDANSETRON 4 MG/1
4 TABLET, ORALLY DISINTEGRATING ORAL EVERY 6 HOURS PRN
Status: DISCONTINUED | OUTPATIENT
Start: 2024-05-03 | End: 2024-05-05 | Stop reason: HOSPADM

## 2024-05-03 RX ORDER — SODIUM CHLORIDE, SODIUM LACTATE, POTASSIUM CHLORIDE, CALCIUM CHLORIDE 600; 310; 30; 20 MG/100ML; MG/100ML; MG/100ML; MG/100ML
INJECTION, SOLUTION INTRAVENOUS CONTINUOUS
Status: DISCONTINUED | OUTPATIENT
Start: 2024-05-03 | End: 2024-05-03 | Stop reason: HOSPADM

## 2024-05-03 RX ORDER — ONDANSETRON 2 MG/ML
INJECTION INTRAMUSCULAR; INTRAVENOUS PRN
Status: DISCONTINUED | OUTPATIENT
Start: 2024-05-03 | End: 2024-05-03

## 2024-05-03 RX ORDER — FENTANYL CITRATE 50 UG/ML
25-100 INJECTION, SOLUTION INTRAMUSCULAR; INTRAVENOUS
Status: DISCONTINUED | OUTPATIENT
Start: 2024-05-03 | End: 2024-05-03 | Stop reason: HOSPADM

## 2024-05-03 RX ORDER — METHOCARBAMOL 750 MG/1
750 TABLET, FILM COATED ORAL EVERY 6 HOURS PRN
Status: DISCONTINUED | OUTPATIENT
Start: 2024-05-03 | End: 2024-05-05 | Stop reason: HOSPADM

## 2024-05-03 RX ORDER — FENTANYL CITRATE 50 UG/ML
50 INJECTION, SOLUTION INTRAMUSCULAR; INTRAVENOUS EVERY 5 MIN PRN
Status: DISCONTINUED | OUTPATIENT
Start: 2024-05-03 | End: 2024-05-03 | Stop reason: HOSPADM

## 2024-05-03 RX ORDER — NALOXONE HYDROCHLORIDE 0.4 MG/ML
0.1 INJECTION, SOLUTION INTRAMUSCULAR; INTRAVENOUS; SUBCUTANEOUS
Status: DISCONTINUED | OUTPATIENT
Start: 2024-05-03 | End: 2024-05-03 | Stop reason: HOSPADM

## 2024-05-03 RX ORDER — BUPIVACAINE HYDROCHLORIDE 2.5 MG/ML
INJECTION, SOLUTION INFILTRATION; PERINEURAL
Status: DISCONTINUED
Start: 2024-05-03 | End: 2024-05-03 | Stop reason: HOSPADM

## 2024-05-03 RX ORDER — ACETAMINOPHEN 325 MG/1
650 TABLET ORAL EVERY 4 HOURS PRN
Status: DISCONTINUED | OUTPATIENT
Start: 2024-05-06 | End: 2024-05-05 | Stop reason: HOSPADM

## 2024-05-03 RX ORDER — HYDROXYZINE HYDROCHLORIDE 25 MG/1
25 TABLET, FILM COATED ORAL EVERY 4 HOURS PRN
Status: DISCONTINUED | OUTPATIENT
Start: 2024-05-03 | End: 2024-05-05 | Stop reason: HOSPADM

## 2024-05-03 RX ORDER — LIDOCAINE 40 MG/G
CREAM TOPICAL
Status: DISCONTINUED | OUTPATIENT
Start: 2024-05-03 | End: 2024-05-03 | Stop reason: HOSPADM

## 2024-05-03 RX ORDER — SODIUM CHLORIDE, SODIUM LACTATE, POTASSIUM CHLORIDE, CALCIUM CHLORIDE 600; 310; 30; 20 MG/100ML; MG/100ML; MG/100ML; MG/100ML
INJECTION, SOLUTION INTRAVENOUS CONTINUOUS
Status: CANCELLED | OUTPATIENT
Start: 2024-05-03

## 2024-05-03 RX ORDER — POLYETHYLENE GLYCOL 3350 17 G/17G
17 POWDER, FOR SOLUTION ORAL DAILY
Status: DISCONTINUED | OUTPATIENT
Start: 2024-05-04 | End: 2024-05-05 | Stop reason: HOSPADM

## 2024-05-03 RX ORDER — DEXAMETHASONE SODIUM PHOSPHATE 4 MG/ML
INJECTION, SOLUTION INTRA-ARTICULAR; INTRALESIONAL; INTRAMUSCULAR; INTRAVENOUS; SOFT TISSUE PRN
Status: DISCONTINUED | OUTPATIENT
Start: 2024-05-03 | End: 2024-05-03

## 2024-05-03 RX ORDER — METHADONE HYDROCHLORIDE 10 MG/ML
10 INJECTION, SOLUTION INTRAMUSCULAR; INTRAVENOUS; SUBCUTANEOUS ONCE
Status: CANCELLED | OUTPATIENT
Start: 2024-05-03

## 2024-05-03 RX ORDER — OXYCODONE HYDROCHLORIDE 5 MG/1
5 TABLET ORAL EVERY 4 HOURS PRN
Status: DISCONTINUED | OUTPATIENT
Start: 2024-05-03 | End: 2024-05-05 | Stop reason: HOSPADM

## 2024-05-03 RX ORDER — PROCHLORPERAZINE MALEATE 10 MG
10 TABLET ORAL EVERY 6 HOURS PRN
Status: DISCONTINUED | OUTPATIENT
Start: 2024-05-03 | End: 2024-05-05 | Stop reason: HOSPADM

## 2024-05-03 RX ORDER — HYDROXYZINE HYDROCHLORIDE 25 MG/1
25 TABLET, FILM COATED ORAL EVERY 6 HOURS PRN
Status: DISCONTINUED | OUTPATIENT
Start: 2024-05-03 | End: 2024-05-05 | Stop reason: HOSPADM

## 2024-05-03 RX ORDER — HYDROMORPHONE HCL IN WATER/PF 6 MG/30 ML
0.2 PATIENT CONTROLLED ANALGESIA SYRINGE INTRAVENOUS
Status: DISCONTINUED | OUTPATIENT
Start: 2024-05-03 | End: 2024-05-05 | Stop reason: HOSPADM

## 2024-05-03 RX ORDER — CEFAZOLIN SODIUM/WATER 2 G/20 ML
2 SYRINGE (ML) INTRAVENOUS
Status: COMPLETED | OUTPATIENT
Start: 2024-05-03 | End: 2024-05-03

## 2024-05-03 RX ORDER — AMOXICILLIN 250 MG
1 CAPSULE ORAL 2 TIMES DAILY
Status: DISCONTINUED | OUTPATIENT
Start: 2024-05-03 | End: 2024-05-05 | Stop reason: HOSPADM

## 2024-05-03 RX ORDER — ONDANSETRON 4 MG/1
4 TABLET, ORALLY DISINTEGRATING ORAL EVERY 30 MIN PRN
Status: DISCONTINUED | OUTPATIENT
Start: 2024-05-03 | End: 2024-05-03 | Stop reason: HOSPADM

## 2024-05-03 RX ORDER — MAGNESIUM HYDROXIDE 1200 MG/15ML
LIQUID ORAL PRN
Status: DISCONTINUED | OUTPATIENT
Start: 2024-05-03 | End: 2024-05-03 | Stop reason: HOSPADM

## 2024-05-03 RX ORDER — GABAPENTIN 300 MG/1
300 CAPSULE ORAL
Status: COMPLETED | OUTPATIENT
Start: 2024-05-03 | End: 2024-05-03

## 2024-05-03 RX ORDER — PROPOFOL 10 MG/ML
INJECTION, EMULSION INTRAVENOUS PRN
Status: DISCONTINUED | OUTPATIENT
Start: 2024-05-03 | End: 2024-05-03

## 2024-05-03 RX ORDER — NALOXONE HYDROCHLORIDE 0.4 MG/ML
0.2 INJECTION, SOLUTION INTRAMUSCULAR; INTRAVENOUS; SUBCUTANEOUS
Status: DISCONTINUED | OUTPATIENT
Start: 2024-05-03 | End: 2024-05-05 | Stop reason: HOSPADM

## 2024-05-03 RX ORDER — FENTANYL CITRATE 50 UG/ML
25-100 INJECTION, SOLUTION INTRAMUSCULAR; INTRAVENOUS
Status: CANCELLED | OUTPATIENT
Start: 2024-05-03

## 2024-05-03 RX ORDER — CEFAZOLIN SODIUM/WATER 2 G/20 ML
2 SYRINGE (ML) INTRAVENOUS SEE ADMIN INSTRUCTIONS
Status: DISCONTINUED | OUTPATIENT
Start: 2024-05-03 | End: 2024-05-03 | Stop reason: HOSPADM

## 2024-05-03 RX ADMIN — FENTANYL CITRATE 50 MCG: 50 INJECTION, SOLUTION INTRAMUSCULAR; INTRAVENOUS at 17:30

## 2024-05-03 RX ADMIN — HYDROMORPHONE HYDROCHLORIDE 0.4 MG: 0.2 INJECTION, SOLUTION INTRAMUSCULAR; INTRAVENOUS; SUBCUTANEOUS at 19:37

## 2024-05-03 RX ADMIN — Medication 5 MG: at 13:44

## 2024-05-03 RX ADMIN — Medication 10 MG: at 11:54

## 2024-05-03 RX ADMIN — HYDROMORPHONE HYDROCHLORIDE 0.4 MG: 0.2 INJECTION, SOLUTION INTRAMUSCULAR; INTRAVENOUS; SUBCUTANEOUS at 21:36

## 2024-05-03 RX ADMIN — METHOCARBAMOL 750 MG: 750 TABLET, FILM COATED ORAL at 19:37

## 2024-05-03 RX ADMIN — MIDAZOLAM 2 MG: 1 INJECTION INTRAMUSCULAR; INTRAVENOUS at 11:15

## 2024-05-03 RX ADMIN — ACETAMINOPHEN 975 MG: 325 TABLET ORAL at 09:50

## 2024-05-03 RX ADMIN — DEXAMETHASONE SODIUM PHOSPHATE 10 MG: 4 INJECTION, SOLUTION INTRA-ARTICULAR; INTRALESIONAL; INTRAMUSCULAR; INTRAVENOUS; SOFT TISSUE at 11:55

## 2024-05-03 RX ADMIN — SODIUM CHLORIDE, POTASSIUM CHLORIDE, SODIUM LACTATE AND CALCIUM CHLORIDE: 600; 310; 30; 20 INJECTION, SOLUTION INTRAVENOUS at 12:05

## 2024-05-03 RX ADMIN — FENTANYL CITRATE 50 MCG: 50 INJECTION INTRAMUSCULAR; INTRAVENOUS at 11:22

## 2024-05-03 RX ADMIN — Medication 10 MG: at 11:44

## 2024-05-03 RX ADMIN — DEXMEDETOMIDINE HYDROCHLORIDE 0.4 MCG/KG/HR: 100 INJECTION, SOLUTION INTRAVENOUS at 11:38

## 2024-05-03 RX ADMIN — SODIUM CHLORIDE, POTASSIUM CHLORIDE, SODIUM LACTATE AND CALCIUM CHLORIDE: 600; 310; 30; 20 INJECTION, SOLUTION INTRAVENOUS at 16:00

## 2024-05-03 RX ADMIN — SODIUM CHLORIDE, POTASSIUM CHLORIDE, SODIUM LACTATE AND CALCIUM CHLORIDE: 600; 310; 30; 20 INJECTION, SOLUTION INTRAVENOUS at 09:51

## 2024-05-03 RX ADMIN — MAGNESIUM SULFATE HEPTAHYDRATE 4 G: 4 INJECTION, SOLUTION INTRAVENOUS at 10:00

## 2024-05-03 RX ADMIN — BUPIVACAINE HYDROCHLORIDE 30 ML: 2.5 INJECTION, SOLUTION EPIDURAL; INFILTRATION; INTRACAUDAL at 11:28

## 2024-05-03 RX ADMIN — ROCURONIUM BROMIDE 50 MG: 10 INJECTION, SOLUTION INTRAVENOUS at 11:22

## 2024-05-03 RX ADMIN — HYDROMORPHONE HYDROCHLORIDE 0.5 MG: 1 INJECTION, SOLUTION INTRAMUSCULAR; INTRAVENOUS; SUBCUTANEOUS at 13:08

## 2024-05-03 RX ADMIN — PROPOFOL 100 MCG/KG/MIN: 10 INJECTION, EMULSION INTRAVENOUS at 11:31

## 2024-05-03 RX ADMIN — HYDROMORPHONE HYDROCHLORIDE 0.5 MG: 1 INJECTION, SOLUTION INTRAMUSCULAR; INTRAVENOUS; SUBCUTANEOUS at 11:59

## 2024-05-03 RX ADMIN — CEFAZOLIN SODIUM 2 G: 2 INJECTION, SOLUTION INTRAVENOUS at 19:38

## 2024-05-03 RX ADMIN — CYCLOBENZAPRINE 10 MG: 10 TABLET, FILM COATED ORAL at 21:35

## 2024-05-03 RX ADMIN — GABAPENTIN 300 MG: 300 CAPSULE ORAL at 09:51

## 2024-05-03 RX ADMIN — ROCURONIUM BROMIDE 30 MG: 10 INJECTION, SOLUTION INTRAVENOUS at 12:03

## 2024-05-03 RX ADMIN — PROPOFOL 150 MG: 10 INJECTION, EMULSION INTRAVENOUS at 11:22

## 2024-05-03 RX ADMIN — ACETAMINOPHEN 975 MG: 325 TABLET ORAL at 19:36

## 2024-05-03 RX ADMIN — PHENYLEPHRINE HYDROCHLORIDE 100 MCG: 10 INJECTION INTRAVENOUS at 13:57

## 2024-05-03 RX ADMIN — ONDANSETRON 4 MG: 2 INJECTION INTRAMUSCULAR; INTRAVENOUS at 12:14

## 2024-05-03 RX ADMIN — SENNOSIDES AND DOCUSATE SODIUM 1 TABLET: 8.6; 5 TABLET ORAL at 20:00

## 2024-05-03 RX ADMIN — SODIUM CHLORIDE: 9 INJECTION, SOLUTION INTRAVENOUS at 19:38

## 2024-05-03 RX ADMIN — Medication 2 G: at 15:15

## 2024-05-03 RX ADMIN — FENTANYL CITRATE 50 MCG: 50 INJECTION INTRAMUSCULAR; INTRAVENOUS at 11:34

## 2024-05-03 RX ADMIN — LIDOCAINE HYDROCHLORIDE 30 MG: 10 INJECTION, SOLUTION EPIDURAL; INFILTRATION; INTRACAUDAL; PERINEURAL at 11:22

## 2024-05-03 RX ADMIN — BUPIVACAINE 20 ML: 13.3 INJECTION, SUSPENSION, LIPOSOMAL INFILTRATION at 11:28

## 2024-05-03 RX ADMIN — ROCURONIUM BROMIDE 20 MG: 10 INJECTION, SOLUTION INTRAVENOUS at 15:10

## 2024-05-03 RX ADMIN — FENTANYL CITRATE 50 MCG: 50 INJECTION, SOLUTION INTRAMUSCULAR; INTRAVENOUS at 17:08

## 2024-05-03 RX ADMIN — Medication 2 G: at 11:16

## 2024-05-03 RX ADMIN — OXYCODONE 10 MG: 5 TABLET ORAL at 20:28

## 2024-05-03 ASSESSMENT — ACTIVITIES OF DAILY LIVING (ADL)
ADLS_ACUITY_SCORE: 26
ADLS_ACUITY_SCORE: 18
ADLS_ACUITY_SCORE: 27
ADLS_ACUITY_SCORE: 27
CHANGE_IN_FUNCTIONAL_STATUS_SINCE_ONSET_OF_CURRENT_ILLNESS/INJURY: YES
DIFFICULTY_COMMUNICATING: NO
ADLS_ACUITY_SCORE: 18
ADLS_ACUITY_SCORE: 27
DOING_ERRANDS_INDEPENDENTLY_DIFFICULTY: NO
DIFFICULTY_EATING/SWALLOWING: NO
NUMBER_OF_TIMES_PATIENT_HAS_FALLEN_WITHIN_LAST_SIX_MONTHS: 1
TOILETING_ISSUES: NO
ADLS_ACUITY_SCORE: 18
ADLS_ACUITY_SCORE: 26
EQUIPMENT_CURRENTLY_USED_AT_HOME: SHOWER CHAIR
VISION_MANAGEMENT: GLASSES
DRESSING/BATHING_DIFFICULTY: NO
ADLS_ACUITY_SCORE: 18
ADLS_ACUITY_SCORE: 27
WEAR_GLASSES_OR_BLIND: YES
ADLS_ACUITY_SCORE: 18
FALL_HISTORY_WITHIN_LAST_SIX_MONTHS: YES
CONCENTRATING,_REMEMBERING_OR_MAKING_DECISIONS_DIFFICULTY: NO
ADLS_ACUITY_SCORE: 18
WALKING_OR_CLIMBING_STAIRS_DIFFICULTY: NO
ADLS_ACUITY_SCORE: 27
ADLS_ACUITY_SCORE: 18

## 2024-05-03 NOTE — ANESTHESIA PREPROCEDURE EVALUATION
Anesthesia Pre-Procedure Evaluation    Patient: Gricelda Levine   MRN: [id number] : 1960        Procedure : Procedure(s):  LUMBAR 5 - SACRAL 1 ANTERIOR LUMBAR INTERBODY FUSION, LUMBAR 5 - SACRAL 1 BILATERAL DECOMPRESSION AND LUMBAR 5 - SACRAL 1 POSTERIOR SPINAL FUSION WITH O-ARM STEALTH NAVIGATION  SURGICAL EXPOSURE, ANTERIOR APPROACH, WITH WOUND CLOSURE, FOR LUMBAR SPINE SURGERY, BY GENERAL SURGERY          Past Medical History:   Diagnosis Date    Gastroesophageal reflux disease     Sleep apnea       Past Surgical History:   Procedure Laterality Date    COLONOSCOPY        No Known Allergies   Social History     Tobacco Use    Smoking status: Never    Smokeless tobacco: Never   Substance Use Topics    Alcohol use: Yes     Comment: mild- 1-2 weekly      Wt Readings from Last 1 Encounters:   24 104.3 kg (230 lb)        Anesthesia Evaluation   Pt has had prior anesthetic.     History of anesthetic complications       ROS/MED HX  ENT/Pulmonary:     (+) sleep apnea, uses CPAP,                                      Neurologic:  - neg neurologic ROS     Cardiovascular:  - neg cardiovascular ROS     METS/Exercise Tolerance:     Hematologic:       Musculoskeletal:       GI/Hepatic:     (+) GERD,                   Renal/Genitourinary:  - neg Renal ROS  (-) BPH   Endo:  - neg endo ROS   (+)               Obesity,       Psychiatric/Substance Use:  - neg psychiatric ROS     Infectious Disease:       Malignancy:  - neg malignancy ROS     Other:      (+)  , H/O Chronic Pain,         Physical Exam    Airway  airway exam normal      Mallampati: II   TM distance: > 3 FB   Neck ROM: full   Mouth opening: > 3 cm    Respiratory Devices and Support         Dental       (+) Minor Abnormalities - some fillings, tiny chips      Cardiovascular   cardiovascular exam normal       Rhythm and rate: regular and normal     Pulmonary   pulmonary exam normal        breath sounds clear to auscultation           OUTSIDE LABS:  CBC: No  "results found for: \"WBC\", \"HGB\", \"HCT\", \"PLT\"  BMP: No results found for: \"NA\", \"POTASSIUM\", \"CHLORIDE\", \"CO2\", \"BUN\", \"CR\", \"GLC\"  COAGS: No results found for: \"PTT\", \"INR\", \"FIBR\"  POC: No results found for: \"BGM\", \"HCG\", \"HCGS\"  HEPATIC: No results found for: \"ALBUMIN\", \"PROTTOTAL\", \"ALT\", \"AST\", \"GGT\", \"ALKPHOS\", \"BILITOTAL\", \"BILIDIRECT\", \"MARGUERITE\"  OTHER: No results found for: \"PH\", \"LACT\", \"A1C\", \"HELEN\", \"PHOS\", \"MAG\", \"LIPASE\", \"AMYLASE\", \"TSH\", \"T4\", \"T3\", \"CRP\", \"SED\"    Anesthesia Plan    ASA Status:  3    NPO Status:  NPO Appropriate    Anesthesia Type: General.     - Airway: ETT   Induction: Intravenous, Propofol.   Maintenance: Balanced.   Techniques and Equipment:       - Drips/Meds: Dexmed. bolus, Ketamine, Fentanyl     Consents    Anesthesia Plan(s) and associated risks, benefits, and realistic alternatives discussed. Questions answered and patient/representative(s) expressed understanding.     - Discussed:     - Discussed with:  Spouse, Patient      - Extended Intubation/Ventilatory Support Discussed: No.      - Patient is DNR/DNI Status: No     Use of blood products discussed: No .     Postoperative Care    Pain management: Peripheral nerve block (Single Shot), Multi-modal analgesia.     - Plan for long acting post-op opioid use   PONV prophylaxis: Ondansetron (or other 5HT-3), Dexamethasone or Solumedrol, Background Propofol Infusion     Comments:    Other Comments: TAP blocks           Gaurav Putnam MD    I have reviewed the pertinent notes and labs in the chart from the past 30 days and (re)examined the patient.  Any updates or changes from those notes are reflected in this note.              # Obesity: Estimated body mass index is 39.48 kg/m  as calculated from the following:    Height as of this encounter: 1.626 m (5' 4\").    Weight as of this encounter: 104.3 kg (230 lb).      "

## 2024-05-03 NOTE — PROGRESS NOTES
Patient is currently more alert and following commands. Woke up complaining of pain rated 7/10 to lower back that is aching in nature. Able to move all extremities. See MAR for pain medication administration.

## 2024-05-03 NOTE — PROGRESS NOTES
Patient still remains heavily sedation after return from OR. Patient is on 6L simple face mask with oxygen levels at 96%. End tital 41. Patient is spontaneously breathing but requires heavy sternal rubs to stimulate a reaction. Patient did open eyes. Will continue to monitor.

## 2024-05-03 NOTE — OP NOTE
[facility] General Surgery Operative Note    Pre-operative diagnosis: Spinal stenosis [M48.00]  Spondylolisthesis [M43.10]   Post-operative diagnosis: Same   Procedure: Procedure(s):  LUMBAR 5 - SACRAL 1 ANTERIOR LUMBAR INTERBODY FUSION, LUMBAR 5 - SACRAL 1 BILATERAL DECOMPRESSION AND LUMBAR 5 - SACRAL 1 POSTERIOR SPINAL FUSION WITH O-ARM STEALTH NAVIGATION  SURGICAL EXPOSURE, ANTERIOR APPROACH, WITH WOUND CLOSURE, FOR LUMBAR SPINE SURGERY, BY GENERAL SURGERY    Surgeon: John Gastelum MD   Assistant(s): None   Anesthesia: General with Block    Estimated blood loss: * No values recorded between 5/3/2024 11:54 AM and 5/3/2024 12:59 PM *   Drains: None   Specimens: * No specimens in log *    Implants: None   Findings: L5-S1 successfully exposed without apparent intraoperative complication   Complications: None   Condition: Stable   Procedure Details: After informed consent was obtained from the patient she was brought to the operating suite and placed the operating table.  Gen. anesthesia was induced and her abdomen was prepped and draped in the usual sterile manner.  A l Pfannenstiel ncision was made.     We incised the rectus sheath just to the left of midline and identified the peritoneal sac I retracted the rectus muscle laterally and retracted the peritoneal sac medially that is from the left toward the right.  The Bookwalter retractor was placed.  By palpation we identified L5-S1 and the bifurcation and great vessels this level was exposed with a combination of blunt dissection and cautery once this disc space was cleared a metallic marker was placed in the joint space and an was x-ray obtained.  This showed that we had successfully exposed L5-S1.  Further dissection was undertaken the vessels were held manually with the [place] retractor.  Dr. Rowell proceded with discectomy and interbody fusion and 3 integrated screws.  This will be dictated separately.  At the conclusion of the case we  placed thrombin-soaked Gelfoam patties between the vessels and the implant.  Hemostasis was meticulous.  The fascia was closed with #1 PDS the subcu was closed with 2-0 Vicryl and 3-0 Monocryl.     John Gastelum MD

## 2024-05-03 NOTE — BRIEF OP NOTE
[facility]    Brief Operative Note    Pre-operative diagnosis: Spinal stenosis [M48.00]  Spondylolisthesis [M43.10]  Post-operative diagnosis Same as pre-operative diagnosis    Procedure: LUMBAR 5 - SACRAL 1 ANTERIOR LUMBAR INTERBODY FUSION, LUMBAR 5 - SACRAL 1 BILATERAL DECOMPRESSION AND LUMBAR 5 - SACRAL 1 POSTERIOR SPINAL FUSION WITH O-ARM STEALTH NAVIGATION, Bilateral - Spine  SURGICAL EXPOSURE, ANTERIOR APPROACH, WITH WOUND CLOSURE, FOR LUMBAR SPINE SURGERY, BY GENERAL SURGERY, Bilateral - Abdomen    Surgeon: Surgeons and Role:     * Po Rowell MD - Primary     * John Gastelum MD - Assisting     * Harika Maldonado APRN CNP - Assisting  Anesthesia: General with Block   Estimated Blood Loss: 225    Drains: [place] and ferguson  Specimens: * No specimens in log *  Findings:   Spondylolisthesis, stenosis .  Complications: None.  Implants:   Implant Name Type Inv. Item Serial No.  Lot No. LRB No. Used Action   GRAFT BN CAN 15CC CRSH 1-10MM [id number] - [id number] Bone/Tissue/Biologic GRAFT BN [facility] 15CC CRSH 1-10MM [id number] [employer]   1 Implanted   GRAFT BN CAN 15CC CRSH 1-10MM [id number] - [id number] Bone/Tissue/Biologic GRAFT BN CAN 15CC CRS 1-10MM [id number] [employer]   1 Implanted   SPACER SPNL [id number] ] 12D MED TI ANTERALIGN SPNL SYS STRL - [id number] Metallic Hardware/[employer] SPACER SPNL [id number] ] 12D MED TI ANTERALIGN SPNL SYS STRL  [employer] [id number] N/A 1 Implanted   ANTERALIGN SPINAL SYSTEM WITH TITAN [place]ANOLOCK SURFACE TECHNOLOGY SCREW 25MM    [employer] [id number] N/A 3 Implanted   GRAFT BONE MAGNETOS 1-2 MM 10 CC ALLOGRAFT GRAN [id number] - [id number]  GRAFT BONE MAGNETOS 1-2 MM 10 CC ALLOGRAFT GRAN [id number]  Quadrille [place]Â©nierie  N/A 1 Implanted   IMP SCR MEDT BREAK-OFF SET SOLERA 4.75MM TI [id number] - [id number] Metallic Hardware/[employer] IMP SCR MEDT BREAK-OFF SET SOLERA 4.75MM TI [id number]   [employer]-DANEK X N/A 4 Implanted   IMP SCR MEDT 4.75MM SOLERA 6.5X45MM MA [id number] - [id number] Metallic Hardware/[place] IMP SCR MEDT 4.75MM SOLERA 6.5X45MM MA [id number]  [employer] X N/A 1 Implanted   IMP SCR MEDT 4.75MM SOLERA 6.5X35MM MA [id number] - [id number] Metallic Hardware/[place] IMP SCR MEDT 4.75MM SOLERA 6.5X35MM MA [id number]  [employer]-DANEK X N/A 1 Explanted   IMP SCR MEDT 4.75MM SOLERA 6.5X50MM MA [id number] - [id number] Metallic Hardware/[place] IMP SCR MEDT 4.75MM SOLERA 6.5X50MM MA [id number]  [employer]-DANEK X N/A 1 Implanted   IMP SCR MEDT 4.75MM SOLERA 6.5X40MM MA [id number] - [id number] Metallic Hardware/[place] IMP SCR MEDT 4.75MM SOLERA 6.5X40MM MA [id number]  [employer]-DANEK X N/A 1 Implanted   IMP MANE MEDT 3.5CM [id number] - [id number] Metallic Hardware/[place] IMP MANE MEDT 3.5CM [id number]  [employer]-DANEK X N/A 2 Implanted

## 2024-05-03 NOTE — INTERVAL H&P NOTE
"I have reviewed the surgical (or preoperative) H&P that is linked to this encounter, and examined the patient. There are no significant changes    Clinical Conditions Present on Arrival:  Clinically Significant Risk Factors Present on Admission                  # Obesity: Estimated body mass index is 39.48 kg/m  as calculated from the following:    Height as of this encounter: 1.626 m (5' 4\").    Weight as of this encounter: 104.3 kg (230 lb).       "

## 2024-05-03 NOTE — ANESTHESIA CARE TRANSFER NOTE
Patient: Gricelda Levine    Procedure: Procedure(s):  LUMBAR 5 - SACRAL 1 ANTERIOR LUMBAR INTERBODY FUSION, LUMBAR 5 - SACRAL 1 BILATERAL DECOMPRESSION AND LUMBAR 5 - SACRAL 1 POSTERIOR SPINAL FUSION WITH O-ARM STEALTH NAVIGATION  SURGICAL EXPOSURE, ANTERIOR APPROACH, WITH WOUND CLOSURE, FOR LUMBAR SPINE SURGERY, BY GENERAL SURGERY       Diagnosis: Spinal stenosis [M48.00]  Spondylolisthesis [M43.10]  Diagnosis Additional Information: No value filed.    Anesthesia Type:   General     Note:    Oropharynx: oropharynx clear of all foreign objects and spontaneously breathing  Level of Consciousness: drowsy  Oxygen Supplementation: face mask  Level of Supplemental Oxygen (L/min / FiO2): 6  Independent Airway: airway patency satisfactory and stable  Dentition: dentition unchanged  Vital Signs Stable: post-procedure vital signs reviewed and stable  Report to RN Given: handoff report given  Patient transferred to: PACU    Handoff Report: Identifed the Patient, Identified the Reponsible Provider, Reviewed the pertinent medical history, Discussed the surgical course, Reviewed Intra-OP anesthesia mangement and issues during anesthesia, Set expectations for post-procedure period and Allowed opportunity for questions and acknowledgement of understanding    Vitals:  Vitals Value Taken Time   /59 05/03/24 1634   Temp 37.2  C (99  F) 05/03/24 1634   Pulse 72 05/03/24 1637   Resp 14 05/03/24 1637   SpO2 97 % 05/03/24 1637   Vitals shown include unfiled device data.    Electronically Signed By: SARIKA Shultz CRNA  May 3, 2024  4:39 PM

## 2024-05-03 NOTE — PHARMACY-ADMISSION MEDICATION HISTORY
Pharmacist Admission Medication History    Admission medication history is complete. The information provided in this note is only as accurate as the sources available at the time of the update.    Information Source(s): Patient and CareEverywhere/SureScripts via in-person    Pertinent Information:      Changes made to PTA medication list:  Added: None  Deleted: None  Changed: None    Allergies reviewed with patient and updates made in EHR: yes    Medication History Completed By: Reji Clifton [facility] 5/3/2024 9:59 AM    PTA Med List   Medication Sig Last Dose    acetaminophen (TYLENOL) 500 MG tablet Take 500-1,000 mg by mouth every 6 hours as needed for mild pain 5/2/2024 at 1500    cyanocobalamin (VITAMIN B-12) 500 MCG SUBL sublingual tablet Place 500 mcg under the tongue daily 4/26/2024 at am    ibuprofen (ADVIL/MOTRIN) 400 MG tablet Take 400 mg by mouth every 6 hours as needed for moderate pain 4/26/2024 at prn    multivitamin w/minerals (MULTI-VITAMIN) tablet Take 1 tablet by mouth daily 4/26/2024 at am    naproxen sodium (ANAPROX) 220 MG tablet Take 220 mg by mouth 2 times daily (with meals) 4/26/2024 at am

## 2024-05-03 NOTE — ANESTHESIA PROCEDURE NOTES
Airway         Procedure Start/Stop Times: 5/3/2024 11:25 AM  Staff -        CRNA: Ramos Queen APRN CRNA       Performed By: CRNA  Consent for Airway        Urgency: elective  Indications and Patient Condition       Indications for airway management: ming-procedural       Induction type:intravenous       Mask difficulty assessment: 1 - vent by mask    Final Airway Details       Final airway type: endotracheal airway       Successful airway: ETT - single  Endotracheal Airway Details        ETT size (mm): 7.0       Cuffed: yes       Successful intubation technique: direct laryngoscopy       DL Blade Type: Scott 2       Grade View of Cords: 1       Adjucts: stylet       Measured from: gums/teeth       Secured at (cm): 23       Bite block used: None    Post intubation assessment        Placement verified by: capnometry, equal breath sounds and chest rise        Number of attempts at approach: 1       Number of other approaches attempted: 0       Secured with: tape       Ease of procedure: easy       Dentition: Intact       Dental guard used and removed. Dental Guard Type: Standard White.    Medication(s) Administered   Medication Administration Time: 5/3/2024 11:25 AM

## 2024-05-03 NOTE — OR NURSING
Per Dr Marko Mendoza, Radiologist, at [facility], the portable abdominal xray was negative for foreign body.

## 2024-05-03 NOTE — OP NOTE
Orthopedic  Operative Note    Pre-operative diagnosis: Spinal stenosis [M48.00] Spondylolisthesis [M43.10]    Post-operative diagnosis: L5-S1 spondylolisthesis with spinal stenosis, back pain, neurogenic leg pain    Procedure: 1) L5-S1 anterior lumbar interbody fusion using Medtronic Anteralign, non-structural allograft, Infuse   2) L5-S1 posterolateral intertransverse spinal fusion using MagnetOs allograft and autograft bone from decompression   3) L5, S1 posterior spinal instrumentation using Medtronic Solera 4.75 pedicle screws and rods   4) L5-S1 bilateral decompression with bilateral hemilaminectomies and partial medial facetectomies   5) Use of intraoperative microscope    Surgeon: Po Rowell MD    Co-Surgeon: Dr. John Gastelum MD (present for the entire anterior interbody fusion component of the case)    Assistant(s): Harika Maldonado NP. Harika is an experienced first surgical assistant whose assistance was necessary for patient positioning, hemostasis, soft tissue and neural retraction, instrumentation, closure, and safe progression of surgery (present and assisting for the posterior fusion/decompression component of the case)    Anesthesia: General endotracheal anesthesia, Regional (TAP blocks), and Local anesthesia (0.25% plain marcaine injected in skin and paraspinal muscles posterior)    Estimated blood loss: 225mL total (25mL anterior, 200mL posterior)     Drains: Last catheter, [place]-Parks    Specimens: None    Indications:                               Gricelda is a pleasant 63 year old female who has a history of low back pain and predominantly right leg pain which is associated with a mobile spondylolisthesis at L5-S1 which is partially from a right L5 spondylolysis and has a degenerative component on the left side. She failed to respond to conservative measures and elected to proceed with a surgical intervention. I had suggested based on her instability and the degree of foraminal collapse  at L5-S1 on the right that she would be an appropriate candidate for an L5-S1 anterior-posterior fusion with posterior decompression.    I discussed the risks of surgery with the patient. The risks of anesthetic being a risk of heart attack, stroke or death. The risks of surgery in general being a risk of bleeding or infection. If an infection were to occur the possibility of need to return to the OR for further operation to debride the area with possible need for prolonged IV antibiotics. The risks for an anterior approach being damage to visceral structures, great vessels. The risk of lumbar fusion surgery to be the possibility of nerve root injury, permanent or temporary, with possibility for weakness, numbness/tingling or additional pain. The risk of increased back pain in the region of the surgical site or adjacent to it. The risk of failure to alleviate symptoms. The risk of non-union or pseudoarthrosis which may necessitate return to the OR for revision fusion procedure. The risk of adjacent segment degeneration above or below the fusion level and possible need for future intervention concerning that. After a discussion of all the risks, the patient agreed to proceed and informed consent was obtained.    Findings: Right L5 spondylolysis, left hypertrophic facet degenerative changes L5-S1, mobile L5-S1 spondylolisthesis    Complications: None     Procedure Detail: The patient was seen in the pre-operative holding area. Informed consent was signed for. The abdomen and low back were both marked with indelible ink. The patient was then transferred back to the operative suite. Anesthesia team then induced general anesia and intubated the patient. A ferguson catheter was placed. TAP blocks were performed by anesthesia service. The anterior abdomen was then prepped and draped in the normal sterile fashion. Prior to incision a critical pause was done to confirm the correct patient, correct level(s), that appropriate  instrumentation was present and that current imaging was available. All in the room were in agreement. 2g Ancef had been administered as prophylactic antibiotic. Dr. Gastelum then provided us with exposure to the anterior lumbar spine through a left retroperitoneal approach. Please see his separate operative note regarding this. The L5-S1 disc was localized with a needle by checking on lateral C-arm imaging. Our operative level was confirmed. The disc was then marked permanently with the bovie.     The anterior annulus of the disc was then incised with a long 11 blade and then resected with the rongeur. A Bishop and a series of curettes we then used to remove additional intervertebral disc material back to the posterior annulus. The angled curette was then used to release the posterior annulus from the back of vertebrae above and below. At this point it was easier to distract. Various trials were then used to assess for the correct footprint and height. The 14mm height, 12 degree lordotic, medium sized footprint trial fit very well and also provided ligamentotaxis to approximate the anatomic disc. This was chosen as our final implant. AP and lateral C-arm shots were taken to also note the mediolateral placement and I then marked the midline on the cranial vertebrae with a marker. I used the rasp to prepare the endplates to get some bleeding bone for fusion without violating the endplate integrity. The final implant was then packed with crushed cancellous allograft bone and Infuse. The implant was then inserted into the disc space and malletted back using lateral C-arm imaging until it was seated appropriately. I then placed screws through the implant into the cranial and caudal vertebrae after initiating the holes with the awl. Final AP and lateral C-arm shots were taken. The wound was then irrigated, hemostasis achieved and the anterior approach incision was closed in layers by Dr Gastelum. All sponge and needle counts  were correct for this portion of the procedure and an X-ray showed no foreign body in the surgical site. There were no noted complications with the anterior portion and we then proceeded with the posterior portion. EBL for the anterior portion was estimated to be 25.    While still under anesthesia the patient was then turned prone on the Pal frame. All bony prominences were well padded. The low back was prepped and draped in the normal sterile fashion. Another pause was taken before proceeding with posterior portion. All in the room were in agreement.    A lateral C-arm shot was taken with spine needles in the skin at anticipated level(s) to approximate the incision. Incision was then made in the midline of low back and carried down to lumbar fascia. Bovie was used to split the fascia in the midline over the palpable spinous processes. I exposed the presumed lamina of L5 and held a metallic instrument up to this and another C-arm lateral was taken to confirm the correct level. I made a permanent bony abby in the L5 lamina with the high speed nelson. Dissection was then carried down on either side along the laminae of L5 to expose the L5-S1 facet joints on either side and subsequently the sacral ala on either side. I then extended the dissection cranially to expose the transverse processes of L5 on either side taking care not to disrupt the capsule of the L4-5 facet joints. The exposure was copiously irrigated. Next, deep gelpi retractors were placed and attention was turned to instrumentation. The fiducial for stealth navigation was placed on the S1 spinous process. Towels were draped over the patient and the O-arm was brought in to spin and register images for instrumentation.    Using Stealth navigation each pedicle was cannulated initially by opening the pedicle dorsally with the navigated nelson. I then used the navigated all-tipped tap to cannulate and tap the pedicle. Screw sizes were measured using the  Stealth navigation. After cannulation and prior to screw placement a flexible probe was used to palpate each pedicle from inside to ensure no breach. I then decorticated each associated transverse process of L5 on either side as well as the bilateral sacral ala. Each screw was then placed and achieved excellent fixation in the bone. All screws were 6.5mm diameter. A second O-arm spin was then done to ensure appropriate screw position. Associated curvilinear rods were placed followed by set screws which were then torqued off using the torque-countertorque device. Attention was then turned towards decompression.    The microscope was brought in for better visualization. I used the double action rongeur to remove the inferior portion of the L5 spinous process and the L5-S1 interspinous ligament. I then used the high speed nelson to thin out the lamina and initiate laminectomy and bilateral medial facetectomies. I resected the thinned bone with of Kerrison rongeurs and then undercut the facet joints. All bone from the use of the nelson was collected with a bone trap. All bone from Kerrison decompression was also saved. This was all to be used to agument the fusion later. The ligamentum was resected with the Kerrison as well. At this point I was decompressed pedicle to pedicle and no notable pressure was noted on the thecal sac above or below. I could pass a Alexandra probe along the lateral recess on either side without any compression. Decompression was felt to be complete at this time.    I then used half of the MagnetOs putty across the posterolateral gutter on either side. Then I mixed the bone dust from the suction trap with bone from the Kerrison decompression. This was split 50/50 on each side in the intertransverse region. Once the bone graft was placed I placed a deanna of appropriate size on either side followed by set screws which were broken off using the torque-countertorque device.     Prior to closure the surgical  site was copiously irrigated. I placed Floseal over the dura. I then called for 1g of Vancomycin powder and placed half of this in the deep subfascial space. A subfascial 10 [place] of [place] round RAMO drain was then placed. I closed the fascia with a #1 Vicryl suture in running fashion. The other half of the Vancomycin powder was placed above the fascia after this space was irrigated. I then closed the deep dermal layer with 2-0 Vicryl and then subcuticular layer with 3-0 stratafix. Steri strips and a sterile dressing were applied. The patient was then turned supine on the gurney and awoken from anesthesia without complication.            Condition: Stable     Weight bearing status: Weight bearing as tolerated     Activity:          Anticoagulation plan:    Plan:        Po Rowell MD  [facility]  Date:  5/3/2024  4:18 PM   Patient may move about with assist as indicated or with supervision. Avoid excessive bending/twisting through low back. No lifting >10lbs. Wear brace when up out of bed (okay to mobilize prior to brace delivery)    Ambulation and mechanical prophylaxis.    Periop Ancef. Multimodal pain management. Admit to inpatient. PT/OT consult. Pull drain when <30cc/shift. Anticipate two overnight stays.

## 2024-05-03 NOTE — ANESTHESIA PROCEDURE NOTES
TAP Procedure Note    Pre-Procedure   Staff -        Anesthesiologist:  Gaurav Putnam MD       Performed By: anesthesiologist       Location: OR       Procedure Start/Stop Times: 5/3/2024 11:28 AM and 5/3/2024 11:33 AM       Pre-Anesthestic Checklist: patient identified, IV checked, site marked, risks and benefits discussed, informed consent, monitors and equipment checked, pre-op evaluation, at physician/surgeon's request and post-op pain management  Timeout:       Correct Patient: Yes        Correct Procedure: Yes        Correct Site: Yes        Correct Position: Yes        Correct Laterality: Yes        Site Marked: Yes  Procedure Documentation  Procedure: TAP       Laterality: bilateral       Patient Position: supine       Patient Prep/Sterile Barriers: sterile gloves, mask       Skin prep: Chloraprep       Needle Type: insulated and short bevel       Needle Gauge: 20.        Needle Length (Inches): 6        Ultrasound guided       1. Ultrasound was used to identify targeted nerve, plexus, vascular marker, or fascial plane and place a needle adjacent to it in real-time.       2. Ultrasound was used to visualize the spread of anesthetic in close proximity to the above referenced structure.       3. A permanent image is entered into the patient's record.       4. The visualized anatomic structures appeared normal.       5. There were no apparent abnormal pathologic findings.    Assessment/Narrative         The placement was negative for: blood aspirated, painful injection and site bleeding       Paresthesias: No.       Bolus given via needle..        Secured via.        Insertion/Infusion Method: Single Shot       Complications: none    Medication(s) Administered   Bupivacaine 0.25% PF (Infiltration) - Infiltration   30 mL - 5/3/2024 11:28:00 AM  Bupivacaine liposome (Exparel) 1.3% LA inj susp (Infiltration) - Infiltration   20 mL - 5/3/2024 11:28:00 AM  Medication Administration Time: 5/3/2024 11:28  "AM      FOR [facility] ONLY:   Pain Team Contact information: please page the Pain Team Via [employer]. Search \"Pain\". During daytime hours, please page the attending first. At night please page the resident first.      "

## 2024-05-04 ENCOUNTER — APPOINTMENT (OUTPATIENT)
Dept: OCCUPATIONAL THERAPY | Facility: CLINIC | Age: 64
End: 2024-05-04
Attending: ORTHOPAEDIC SURGERY
Payer: COMMERCIAL

## 2024-05-04 ENCOUNTER — APPOINTMENT (OUTPATIENT)
Dept: PHYSICAL THERAPY | Facility: CLINIC | Age: 64
End: 2024-05-04
Attending: ORTHOPAEDIC SURGERY
Payer: COMMERCIAL

## 2024-05-04 LAB
HGB BLD-MCNC: 11.2 G/DL (ref 11.7–15.7)
HOLD SPECIMEN: NORMAL

## 2024-05-04 PROCEDURE — 97116 GAIT TRAINING THERAPY: CPT | Mod: GP

## 2024-05-04 PROCEDURE — 120N000001 HC R&B MED SURG/OB

## 2024-05-04 PROCEDURE — 36415 COLL VENOUS BLD VENIPUNCTURE: CPT | Performed by: ORTHOPAEDIC SURGERY

## 2024-05-04 PROCEDURE — 250N000013 HC RX MED GY IP 250 OP 250 PS 637: Performed by: ORTHOPAEDIC SURGERY

## 2024-05-04 PROCEDURE — G0378 HOSPITAL OBSERVATION PER HR: HCPCS

## 2024-05-04 PROCEDURE — 97161 PT EVAL LOW COMPLEX 20 MIN: CPT | Mod: GP

## 2024-05-04 PROCEDURE — 250N000009 HC RX 250: Performed by: ORTHOPAEDIC SURGERY

## 2024-05-04 PROCEDURE — 999N000157 HC STATISTIC RCP TIME EA 10 MIN

## 2024-05-04 PROCEDURE — 97166 OT EVAL MOD COMPLEX 45 MIN: CPT | Mod: GO

## 2024-05-04 PROCEDURE — 250N000011 HC RX IP 250 OP 636: Performed by: ORTHOPAEDIC SURGERY

## 2024-05-04 PROCEDURE — 97535 SELF CARE MNGMENT TRAINING: CPT | Mod: GO

## 2024-05-04 PROCEDURE — 97530 THERAPEUTIC ACTIVITIES: CPT | Mod: GP

## 2024-05-04 PROCEDURE — 85018 HEMOGLOBIN: CPT | Performed by: ORTHOPAEDIC SURGERY

## 2024-05-04 RX ORDER — HYDROXYZINE HYDROCHLORIDE 25 MG/1
25 TABLET, FILM COATED ORAL EVERY 4 HOURS PRN
Qty: 30 TABLET | Refills: 1 | Status: SHIPPED | OUTPATIENT
Start: 2024-05-04

## 2024-05-04 RX ORDER — GINSENG 100 MG
CAPSULE ORAL 2 TIMES DAILY PRN
Status: DISCONTINUED | OUTPATIENT
Start: 2024-05-04 | End: 2024-05-05 | Stop reason: HOSPADM

## 2024-05-04 RX ORDER — AMOXICILLIN 250 MG
1 CAPSULE ORAL 2 TIMES DAILY
Qty: 20 TABLET | Refills: 1 | Status: SHIPPED | OUTPATIENT
Start: 2024-05-04

## 2024-05-04 RX ORDER — METHOCARBAMOL 750 MG/1
750 TABLET, FILM COATED ORAL EVERY 6 HOURS PRN
Qty: 30 TABLET | Refills: 0 | Status: SHIPPED | OUTPATIENT
Start: 2024-05-04

## 2024-05-04 RX ORDER — OXYCODONE HYDROCHLORIDE 5 MG/1
5-10 TABLET ORAL EVERY 4 HOURS PRN
Qty: 40 TABLET | Refills: 0 | Status: SHIPPED | OUTPATIENT
Start: 2024-05-04

## 2024-05-04 RX ADMIN — CYCLOBENZAPRINE 10 MG: 10 TABLET, FILM COATED ORAL at 22:10

## 2024-05-04 RX ADMIN — CEFAZOLIN SODIUM 2 G: 2 INJECTION, SOLUTION INTRAVENOUS at 05:11

## 2024-05-04 RX ADMIN — HYDROXYZINE HYDROCHLORIDE 25 MG: 25 TABLET ORAL at 19:48

## 2024-05-04 RX ADMIN — OXYCODONE 10 MG: 5 TABLET ORAL at 05:11

## 2024-05-04 RX ADMIN — METHOCARBAMOL 750 MG: 750 TABLET, FILM COATED ORAL at 05:11

## 2024-05-04 RX ADMIN — ACETAMINOPHEN 975 MG: 325 TABLET ORAL at 11:12

## 2024-05-04 RX ADMIN — HYDROXYZINE HYDROCHLORIDE 25 MG: 25 TABLET ORAL at 01:12

## 2024-05-04 RX ADMIN — POLYETHYLENE GLYCOL 3350 17 G: 17 POWDER, FOR SOLUTION ORAL at 07:36

## 2024-05-04 RX ADMIN — ACETAMINOPHEN 975 MG: 325 TABLET ORAL at 18:34

## 2024-05-04 RX ADMIN — SENNOSIDES AND DOCUSATE SODIUM 1 TABLET: 8.6; 5 TABLET ORAL at 07:37

## 2024-05-04 RX ADMIN — SENNOSIDES AND DOCUSATE SODIUM 1 TABLET: 8.6; 5 TABLET ORAL at 22:10

## 2024-05-04 RX ADMIN — BACITRACIN: 500 OINTMENT TOPICAL at 14:40

## 2024-05-04 RX ADMIN — ACETAMINOPHEN 975 MG: 325 TABLET ORAL at 05:11

## 2024-05-04 RX ADMIN — OXYCODONE 5 MG: 5 TABLET ORAL at 18:34

## 2024-05-04 RX ADMIN — METHOCARBAMOL 750 MG: 750 TABLET, FILM COATED ORAL at 14:48

## 2024-05-04 RX ADMIN — OXYCODONE 5 MG: 5 TABLET ORAL at 11:12

## 2024-05-04 RX ADMIN — OXYCODONE 10 MG: 5 TABLET ORAL at 01:12

## 2024-05-04 RX ADMIN — THERA TABS 1 TABLET: TAB at 07:37

## 2024-05-04 ASSESSMENT — ACTIVITIES OF DAILY LIVING (ADL)
ADLS_ACUITY_SCORE: 27
ADLS_ACUITY_SCORE: 33
PREVIOUS_RESPONSIBILITIES: MEAL PREP;HOUSEKEEPING;LAUNDRY;SHOPPING;MEDICATION MANAGEMENT;FINANCES;DRIVING
ADLS_ACUITY_SCORE: 33
ADLS_ACUITY_SCORE: 27
ADLS_ACUITY_SCORE: 33
ADLS_ACUITY_SCORE: 33
ADLS_ACUITY_SCORE: 31
ADLS_ACUITY_SCORE: 31
ADLS_ACUITY_SCORE: 27
ADLS_ACUITY_SCORE: 33
ADLS_ACUITY_SCORE: 27
ADLS_ACUITY_SCORE: 33
ADLS_ACUITY_SCORE: 33
ADLS_ACUITY_SCORE: 27
ADLS_ACUITY_SCORE: 31
ADLS_ACUITY_SCORE: 27
ADLS_ACUITY_SCORE: 33

## 2024-05-04 NOTE — PLAN OF CARE
"Note for RN cares provided 1500 - 1900:    Patient vital signs are at baseline: Yes - exception - soft BP at start of nanci shift 93/53 - pt denies hypotensive symptoms at time of assessment. Encouraging PO fluid intake.    BP 93/53 (BP Location: Right arm)   Pulse 65   Temp 97.8  F (36.6  C) (Oral)   Resp 17   Ht 1.626 m (5' 4\")   Wt 104.3 kg (230 lb)   SpO2 96%   BMI 39.48 kg/m      BP improved to 108/52 at recheck. Provided education to pt for safe transfer and ambulation.    Patient able to ambulate as they were prior to admission or with assist devices provided by therapies during their stay:  Yes  Patient MUST void prior to discharge:  Yes Completed PVRs per protocol.  Patient able to tolerate oral intake:  Yes  Pain has adequate pain control using Oral analgesics:  Yes  Does patient have an identified :  Yes  Has goal D/C date and time been discussed with patient:  Yes      "

## 2024-05-04 NOTE — PROGRESS NOTES
"[facility] Progress Note      Post-operative Day: 1 Day Post-Op    Procedure(s):  LUMBAR 5 - SACRAL 1 ANTERIOR LUMBAR INTERBODY FUSION, LUMBAR 5 - SACRAL 1 BILATERAL DECOMPRESSION AND LUMBAR 5 - SACRAL 1 POSTERIOR SPINAL FUSION WITH O-ARM STEALTH NAVIGATION  SURGICAL EXPOSURE, ANTERIOR APPROACH, WITH WOUND CLOSURE, FOR LUMBAR SPINE SURGERY, BY GENERAL SURGERY      Subjective:    Pain: moderate  Chest pain, SOB:  No    Has expected pain in back and anterior abdomen being POD#1. States that her feet did not feel numb when woke up. Did a lap out to the nursing station and denies any radiating leg pain. Has not started regular diet yet, just fluids, but no N/V. Last removed this AM, due to void. No BM or flatus yet.     Objective:  Blood pressure 108/54, pulse 62, temperature 97.9  F (36.6  C), temperature source Oral, resp. rate 16, height 1.626 m (5' 4\"), weight 104.3 kg (230 lb), SpO2 95%.    Patient Vitals for the past 24 hrs:   BP Temp Temp src Pulse Resp SpO2 Height Weight   05/04/24 0500 108/54 97.9  F (36.6  C) Oral 62 16 95 % -- --   05/04/24 0100 110/53 97.5  F (36.4  C) Oral 63 16 96 % -- --   05/03/24 2120 102/51 97.5  F (36.4  C) Oral 75 16 97 % -- --   05/03/24 2020 97/53 97.8  F (36.6  C) Oral 68 18 96 % -- --   05/03/24 1920 91/51 -- -- 65 15 97 % -- --   05/03/24 1850 93/48 97.6  F (36.4  C) Oral 63 16 97 % -- --   05/03/24 1820 97/54 97.7  F (36.5  C) Oral 74 16 96 % -- --   05/03/24 1800 103/51 -- -- 74 12 98 % -- --   05/03/24 1750 103/51 97.2  F (36.2  C) Temporal 74 15 98 % -- --   05/03/24 1740 107/54 -- -- 64 10 98 % -- --   05/03/24 1730 109/51 -- -- 76 12 97 % -- --   05/03/24 1720 105/54 98.4  F (36.9  C) Temporal 71 16 96 % -- --   05/03/24 1710 103/53 -- -- 79 14 96 % -- --   05/03/24 1700 98/53 97.9  F (36.6  C) Temporal 73 19 96 % -- --   05/03/24 1650 90/52 98.5  F (36.9  C) Temporal 72 13 96 % -- --   05/03/24 1640 90/53 98.5  F (36.9  C) Temporal 74 21 96 % -- -- " "  05/03/24 1634 110/59 99  F (37.2  C) Temporal 67 16 99 % -- --   05/03/24 0942 131/60 97.3  F (36.3  C) Temporal 64 18 97 % 1.626 m (5' 4\") 104.3 kg (230 lb)       Wt Readings from Last 4 Encounters:   05/03/24 104.3 kg (230 lb)         Motor function, sensation, and circulation intact   Yes  Wound status: incisions are clean dry and intact. Yes  Calf tenderness: Bilateral  No    Pertinent Labs   Lab Results: personally reviewed.     Recent Labs   Lab Test 05/04/24  0650   HGB 11.2*       Plan: Anticoagulation protocol: Mechanical and/or ambulation              Pain medications: oxycodone, tylenol, vistaril, and flexeril or methocarbamol            Weight bearing status:  WBAT   Avoid excessive bending/twisting through low back. No lifting >10lbs.   Wear lumbar corset brace when up out of bed.   Monitor RAMO drain output. Pull drain when <30cc/shfit. If drain output continues to be high will consider changing it from bulb suction to gravity only later today.            Disposition:  anticipate discharge home tomorrw             Continue cares and rehabilitation     Report completed by:  Po Rowell MD  Date: 5/4/2024  Time: 9:09 AM   "

## 2024-05-04 NOTE — PLAN OF CARE
Problem: Adult Inpatient Plan of Care  Goal: Absence of Hospital-Acquired Illness or Injury  Intervention: Identify and Manage Fall Risk  Recent Flowsheet Documentation  Taken 5/3/2024 2200 by Adri Burdick RN  Safety Promotion/Fall Prevention:   activity supervised   clutter free environment maintained   increased rounding and observation   increase visualization of patient   lighting adjusted   mobility aid in reach   nonskid shoes/slippers when out of bed   safety round/check completed  Taken 5/3/2024 2000 by Adri Burdick RN  Safety Promotion/Fall Prevention:   activity supervised   clutter free environment maintained   increased rounding and observation   increase visualization of patient   lighting adjusted   mobility aid in reach   nonskid shoes/slippers when out of bed   safety round/check completed   Goal Outcome Evaluation:      Plan of Care Reviewed With: patient    Overall Patient Progress: improvingOverall Patient Progress: improving  Patient vital signs are at baseline: Yes  Patient able to ambulate as they were prior to admission or with assist devices provided by therapies during their stay:  No,  Reason:  due to ambulate  Patient MUST void prior to discharge:  Yes, has a ferguson in place  Patient able to tolerate oral intake:  Yes  Pain has adequate pain control using Oral analgesics:  Yes  Does patient have an identified :  Yes  Has goal D/C date and time been discussed with patient:  Yes   Pt alert and oriented, vss on 2liters of oxygen, had her CPAP on at night, pain medication given, pt has a ferguson and a hemovac in place. Ice pack on the back, dressing at back and front are CDI, denies nausea

## 2024-05-04 NOTE — PROGRESS NOTES
05/04/24 0743   Appointment Info   Signing Clinician's Name / Credentials (PT) Colleen Pereira PT DPT [facility]   Living Environment   People in Home spouse   Current Living Arrangements house   Home Accessibility stairs to enter home;stairs within home   Number of Stairs, Main Entrance 4   Stair Railings, Main Entrance railings on both sides of stairs   Number of Stairs, Within Home, Primary one   Transportation Anticipated family or friend will provide   Self-Care   Equipment Currently Used at Home shower chair;walker, rolling   Fall history within last six months yes   Number of times patient has fallen within last six months 1   General Information   Onset of Illness/Injury or Date of Surgery 05/03/24   Referring Physician Dr. DENY Rwoell   Patient/Family Therapy Goals Statement (PT) return home with spouse   Pertinent History of Current Problem (include personal factors and/or comorbidities that impact the POC) Admitted on 5/3/24 and underwent s/p lumbosacral A/P fusion.   Existing Precautions/Restrictions brace worn when out of bed;spinal   Cognition   Affect/Mental Status (Cognition) WNL   Orientation Status (Cognition) oriented x 4   Follows Commands (Cognition) WNL   Pain Assessment   Patient Currently in Pain Yes, see Vital Sign flowsheet   Range of Motion (ROM)   ROM Comment B LE grossly WNL for ROM   Strength (Manual Muscle Testing)   Strength Comments B LE grossly > 3+/5   Bed Mobility   Comment, (Bed Mobility) supine to sitting at R side of bed with Min A   Transfers   Comment, (Transfers) sit <> stand with FWW and Min A   Gait/Stairs (Locomotion)   [place] Level (Gait) contact guard   Assistive Device (Gait) walker, front-wheeled   Distance in Feet (Gait) 10 ft   Pattern (Gait) step-to;swing-to   Deviations/Abnormal Patterns (Gait) base of support, wide;darryn decreased   Clinical Impression   Criteria for Skilled Therapeutic Intervention Yes, treatment indicated   PT Diagnosis (PT) impaired  functional mobility   Influenced by the following impairments weakness, pain   Functional limitations due to impairments bed mobility, transfers, gait   Clinical Presentation (PT Evaluation Complexity) stable   Clinical Presentation Rationale Pt presents as medically diagnosed   Clinical Decision Making (Complexity) low complexity   Planned Therapy Interventions (PT) transfer training;gait training;stair training   Risk & Benefits of therapy have been explained evaluation/treatment results reviewed;care plan/treatment goals reviewed;risks/benefits reviewed;current/potential barriers reviewed;patient;spouse/significant other;participants voiced agreement with care plan;participants included   PT Total Evaluation Time   PT Eval, Low Complexity Minutes (40991) 10   Physical Therapy Goals   PT Frequency 2x/day   PT Predicted Duration/Target Date for Goal Attainment 05/07/24   PT Goals Transfers;Gait;Stairs   PT: Transfers Supervision/stand-by assist;Sit to/from stand;Bed to/from chair;Assistive device;Within precautions   PT: Gait Supervision/stand-by assist;Rolling walker;Within precautions;100 feet   PT: Stairs Supervision/stand-by assist;4 stairs;Within precautions;Rail on both sides   Interventions   Interventions Quick Adds Gait Training;Therapeutic Activity;Therapeutic Procedure   Therapeutic Activity   Therapeutic Activities: dynamic activities to improve functional performance Minutes (93665) 8   Symptoms Noted During/After Treatment Increased pain   Treatment Detail/Skilled Intervention Patient educated/performed log roll technique using rail and verbal cues for sequencing.  Able to gt to EOB with cues and Min A.  Once sitting at EOB, patient able to ani brace with Min A for brace positioning.  Patient instructed on UE sequencing with Sit <> stand from bed/chair using FWW.  Pt left with RN up in chair at end of session.   Gait Training   Gait Training Minutes (99938) 10   Symptoms Noted During/After Treatment  (Gait Training) increased pain;fatigue   Treatment Detail/Skilled Intervention Pt amb in hallway with brace on using FWW with CGA.  Demonstrated decreased darryn and decreased B step length.   Distance in Feet 25 ft   [place] Level (Gait Training) contact guard   Physical Assistance Level (Gait Training) supervision   Assistive Device (Gait Training) rolling walker   Gait Analysis Deviations decreased darryn;increased time in double stance;decreased velocity of limb motion;decreased step length;increased stride width   Impairments (Gait Analysis/Training) pain;strength decreased   PT Discharge Planning   PT Plan progress amb distance; trial stairs   PT Discharge Recommendation (DC Rec) home with assist   PT Rationale for DC Rec Pt has supportive spouse that is able to assist as needed   PT Brief overview of current status CGA with amb using FWW, Min A + cues for transfers/bed mobility   PT Equipment Needed at Discharge   (Pt reports has FWW)   Total Session Time   Timed Code Treatment Minutes 18   Total Session Time (sum of timed and untimed services) 28

## 2024-05-04 NOTE — PROGRESS NOTES
05/04/24 0930   Appointment Info   Signing Clinician's Name / Credentials (OT) Juliana Caballero OTR/L   Living Environment   People in Home spouse   Current Living Arrangements house   Living Environment Comments WIS with shower chair, RTS.   will be adding GB as needed.   Self-Care   Usual Activity Tolerance good   Current Activity Tolerance moderate   Equipment Currently Used at Home shower chair;walker, rolling   Instrumental Activities of Daily Living (IADL)   Previous Responsibilities meal prep;housekeeping;laundry;shopping;medication management;finances;driving   IADL Comments  will care for IADLs at home as well   General Information   Onset of Illness/Injury or Date of Surgery 05/03/24   Referring Physician Dr. Po Rowell   Patient/Family Therapy Goal Statement (OT) To return home with help from    Additional Occupational Profile Info/Pertinent History of Current Problem Adm for A-P LumbarSacral Fusion.  PMH:  Lung nodule, Lipoma of torso, obesity, GERD, SAMMI, Hyperlip.   Existing Precautions/Restrictions (S)  other (see comments);spinal  (RAMO drain in place.  Back brace.)   Cognitive Status Examination   Follows Commands WNL   Visual Perception   Visual Impairment/Limitations corrective lenses for reading   Bed Mobility   Bed Mobility sit-supine   Sit-Supine [place] (Bed Mobility) minimum assist (75% patient effort)   Transfers   Transfers sit-stand transfer;toilet transfer;bed-chair transfer   Transfer Skill: Bed to Chair/Chair to Bed   Bed-Chair [place] (Transfers) supervision;verbal cues   Assistive Device (Bed-Chair Transfers) rolling walker   Sit-Stand Transfer   Sit-Stand [place] (Transfers) supervision;verbal cues   Assistive Device (Sit-Stand Transfers) walker, front-wheeled   Toilet Transfer   Type (Toilet Transfer) sit-stand;stand-sit   [place] Level (Toilet Transfer) supervision;verbal cues   Assistive Device (Toilet Transfer) walker, front-wheeled    Balance   Balance Assessment standing dynamic balance   Standing Balance: Dynamic supervision;verbal cues   Position/Device Used, Standing Balance walker, front-wheeled   Activities of Daily Living   BADL Assessment/Intervention lower body dressing;upper body dressing   Upper Body Dressing Assessment/Training   Position (Upper Body Dressing) supported sitting   [place] Level (Upper Body Dressing) upper body dressing skills;don;bra/undergarment;pull-over garment;supervision;verbal cues   Lower Body Dressing Assessment/Training   Position (Lower Body Dressing) supported sitting;supported standing   Assistive Devices (Lower Body Dressing) reacher;sock-aid   [place] Level (Lower Body Dressing) lower body dressing skills;doff;pants/bottoms;socks;minimum assist (75% patient effort)   Clinical Impression   Criteria for Skilled Therapeutic Interventions Met (OT) Yes, treatment indicated   OT Diagnosis decreased indep with ADLs and trsfs due to spine surgery.   OT Problem List-Impairments impacting ADL problems related to;mobility   Assessment of Occupational Performance 3-5 Performance Deficits   Identified Performance Deficits dressing, toileting, bathing, trsfs.   Planned Therapy Interventions (OT) ADL retraining   Clinical Decision Making Complexity (OT) detailed assessment/moderate complexity   Risk & Benefits of therapy have been explained evaluation/treatment results reviewed;participants included;patient;spouse/significant other   OT Total Evaluation Time   OT Eval, Moderate Complexity Minutes (60291) 15   OT Goals   Therapy Frequency (OT) 2 times/day   OT Predicted Duration/Target Date for Goal Attainment 05/06/24   OT Goals Hygiene/Grooming;Upper Body Dressing;Lower Body Dressing;Bed Mobility;Toilet Transfer/Toileting   OT: Hygiene/Grooming modified independent   OT: Upper Body Dressing Modified independent   OT: Lower Body Dressing Modified independent   OT: Bed Mobility Modified independent   OT:  Toilet Transfer/Toileting Modified independent   Interventions   Interventions Quick Adds Self-Care/Home Management   Self-Care/Home Management   Self-Care/Home Mgmt/ADL, Compensatory, Meal Prep Minutes (25451) 30   Symptoms Noted During/After Treatment (Meal Preparation/Planning Training) increased pain   Treatment Detail/Skilled Intervention Pt in the bathroom when therapist arrived.  RAMO drain in place.  Pt wearing her back brace and using her FWW.  SBA needed for trsfs off of the toilet.  Used GB on the R side.  VC for head over spine.    Able to complete her own ming care.  Stood at sink for washing her hands with SBA of one.  Returned to her chair for FB dressing.  Educated on sequencing and use of AE - reacher and sock aid to stay within her spine precautions.  Provided Pt and  with spine precautions handout which was reviewed with them.  Min A needed for dressing.  Trsf back to bed with min A of one.  Educated Pt on log roll method to get back into the bed.  Min A of one.  At end of session, bed alarm on and call light in reach.  Famil present.  RN aware.   OT Discharge Planning   OT Plan Kitchen, car trsf.  log rolling in and out.   OT Discharge Recommendation (DC Rec) (S)  home with assist   OT Rationale for DC Rec Pt will have assist at home from her .   OT Brief overview of current status SBA to min A with trsfs and ADLs   Total Session Time   Timed Code Treatment Minutes 30   Total Session Time (sum of timed and untimed services) 45

## 2024-05-04 NOTE — ANESTHESIA POSTPROCEDURE EVALUATION
Patient: Gricelda Levine    Procedure: Procedure(s):  LUMBAR 5 - SACRAL 1 ANTERIOR LUMBAR INTERBODY FUSION, LUMBAR 5 - SACRAL 1 BILATERAL DECOMPRESSION AND LUMBAR 5 - SACRAL 1 POSTERIOR SPINAL FUSION WITH O-ARM STEALTH NAVIGATION  SURGICAL EXPOSURE, ANTERIOR APPROACH, WITH WOUND CLOSURE, FOR LUMBAR SPINE SURGERY, BY GENERAL SURGERY       Anesthesia Type:  General    Note:  Disposition: Inpatient   Postop Pain Control:             Sign Out: Well controlled pain   PONV: No   Neuro/Psych:             Sign Out: Acceptable/Baseline neuro status   Airway/Respiratory:             Sign Out: Acceptable/Baseline resp. status   CV/Hemodynamics:             Sign Out: Acceptable CV status   Other NRE: NONE   DID A NON-ROUTINE EVENT OCCUR?            Last vitals:  Vitals Value Taken Time   /51 05/03/24 1800   Temp 36.2  C (97.2  F) 05/03/24 1750   Pulse 70 05/03/24 1806   Resp 9 05/03/24 1802   SpO2 98 % 05/03/24 1806   Vitals shown include unfiled device data.    Electronically Signed By: Gregory Ozuna MD  May 3, 2024  8:55 PM

## 2024-05-04 NOTE — PLAN OF CARE
Patient vital signs are at baseline: Yes  Patient able to ambulate as they were prior to admission or with assist devices provided by therapies during their stay:  Yes  Patient MUST void prior to discharge:  Yes Continues PVR checks per protocols, has completed 2 PVRs so far.  Patient able to tolerate oral intake:  Yes  Pain has adequate pain control using Oral analgesics:  Yes  Does patient have an identified :  Yes  Has goal D/C date and time been discussed with patient:  Yes      Monitoring drain output, likely to discharge home 5/5/24.

## 2024-05-05 ENCOUNTER — APPOINTMENT (OUTPATIENT)
Dept: OCCUPATIONAL THERAPY | Facility: CLINIC | Age: 64
End: 2024-05-05
Attending: ORTHOPAEDIC SURGERY
Payer: COMMERCIAL

## 2024-05-05 ENCOUNTER — APPOINTMENT (OUTPATIENT)
Dept: PHYSICAL THERAPY | Facility: CLINIC | Age: 64
End: 2024-05-05
Attending: ORTHOPAEDIC SURGERY
Payer: COMMERCIAL

## 2024-05-05 VITALS
SYSTOLIC BLOOD PRESSURE: 106 MMHG | HEART RATE: 77 BPM | OXYGEN SATURATION: 91 % | WEIGHT: 230 LBS | RESPIRATION RATE: 18 BRPM | BODY MASS INDEX: 39.27 KG/M2 | TEMPERATURE: 98.4 F | DIASTOLIC BLOOD PRESSURE: 51 MMHG | HEIGHT: 64 IN

## 2024-05-05 LAB — HGB BLD-MCNC: 11.5 G/DL (ref 11.7–15.7)

## 2024-05-05 PROCEDURE — G0378 HOSPITAL OBSERVATION PER HR: HCPCS

## 2024-05-05 PROCEDURE — 97535 SELF CARE MNGMENT TRAINING: CPT | Mod: GO

## 2024-05-05 PROCEDURE — 36415 COLL VENOUS BLD VENIPUNCTURE: CPT | Performed by: ORTHOPAEDIC SURGERY

## 2024-05-05 PROCEDURE — 85018 HEMOGLOBIN: CPT | Performed by: ORTHOPAEDIC SURGERY

## 2024-05-05 PROCEDURE — 97116 GAIT TRAINING THERAPY: CPT | Mod: GP

## 2024-05-05 PROCEDURE — 97530 THERAPEUTIC ACTIVITIES: CPT | Mod: GP

## 2024-05-05 PROCEDURE — 250N000013 HC RX MED GY IP 250 OP 250 PS 637: Performed by: ORTHOPAEDIC SURGERY

## 2024-05-05 RX ADMIN — THERA TABS 1 TABLET: TAB at 08:53

## 2024-05-05 RX ADMIN — ACETAMINOPHEN 975 MG: 325 TABLET ORAL at 03:34

## 2024-05-05 RX ADMIN — OXYCODONE 5 MG: 5 TABLET ORAL at 12:00

## 2024-05-05 RX ADMIN — SENNOSIDES AND DOCUSATE SODIUM 1 TABLET: 8.6; 5 TABLET ORAL at 08:53

## 2024-05-05 RX ADMIN — OXYCODONE 5 MG: 5 TABLET ORAL at 10:35

## 2024-05-05 RX ADMIN — OXYCODONE 5 MG: 5 TABLET ORAL at 06:25

## 2024-05-05 RX ADMIN — ACETAMINOPHEN 975 MG: 325 TABLET ORAL at 10:34

## 2024-05-05 RX ADMIN — OXYCODONE 10 MG: 5 TABLET ORAL at 01:21

## 2024-05-05 RX ADMIN — POLYETHYLENE GLYCOL 3350 17 G: 17 POWDER, FOR SOLUTION ORAL at 08:53

## 2024-05-05 ASSESSMENT — ACTIVITIES OF DAILY LIVING (ADL)
ADLS_ACUITY_SCORE: 31
ADLS_ACUITY_SCORE: 32
ADLS_ACUITY_SCORE: 32
ADLS_ACUITY_SCORE: 31
ADLS_ACUITY_SCORE: 32
ADLS_ACUITY_SCORE: 31
ADLS_ACUITY_SCORE: 32
ADLS_ACUITY_SCORE: 31
ADLS_ACUITY_SCORE: 32
ADLS_ACUITY_SCORE: 32
ADLS_ACUITY_SCORE: 31
ADLS_ACUITY_SCORE: 31
ADLS_ACUITY_SCORE: 32

## 2024-05-05 NOTE — PLAN OF CARE
A/O x 4, VSS on RA. L PIV SL. Pain managed with ice, repositioning, and medications per the MAR. Hgb: 11.5 today. RAMO drain adjusted to drain w/ gravity rather than suction per Dr. Rowell; RAMO output of 5 ml since adjusting drain. Discharge pending OT clearance and adequate pain control.     Problem: Spinal Surgery  Goal: Absence of Bleeding  Intervention: Monitor and Manage Bleeding  Recent Flowsheet Documentation  Taken 5/5/2024 0856 by Gina Nayak RN  Bleeding Management: dressing monitored  Goal: Effective Bowel Elimination  Intervention: Enhance Bowel Motility and Elimination  Recent Flowsheet Documentation  Taken 5/5/2024 0856 by Gina Nayak RN  Bowel Motility Enhancement:   ambulation promoted   fluid intake encouraged  Goal: Optimal Functional Ability  Intervention: Optimize Functional Status  Recent Flowsheet Documentation  Taken 5/5/2024 0856 by Gina Nayak RN  Activity Management: activity adjusted per tolerance  Positioning/Transfer Devices:   pillows   in use  Goal: Optimal Neurologic Function  Intervention: Optimize Neurologic Function  Recent Flowsheet Documentation  Taken 5/5/2024 0856 by Gina Nayak RN  Body Position: weight shifting  Goal: Anesthesia/Sedation Recovery  Intervention: Optimize Anesthesia Recovery  Recent Flowsheet Documentation  Taken 5/5/2024 0856 by Gina Nayak RN  Safety Promotion/Fall Prevention:   safety round/check completed   activity supervised   assistive device/personal items within reach   clutter free environment maintained   room near nurse's station   supervised activity  Stabilization Measures: legs elevated  Goal: Optimal Pain Control and Function  Intervention: Prevent or Manage Pain  Recent Flowsheet Documentation  Taken 5/5/2024 1200 by Gina Nayak RN  Pain Management Interventions: (Working with therapies) medication (see MAR)  Taken 5/5/2024 1130 by Gina Nayak RN  Pain Management Interventions:   rest   quiet environment  facilitated  Taken 5/5/2024 1034 by Gina Nayak, RN  Pain Management Interventions:   medication (see MAR)   quiet environment facilitated   rest   repositioned  Taken 5/5/2024 0852 by Gina Nayak, RN  Pain Management Interventions:   cold applied   quiet environment facilitated   pain management plan reviewed with patient/caregiver   rest  Goal: Effective Oxygenation and Ventilation  Intervention: Optimize Oxygenation and Ventilation  Recent Flowsheet Documentation  Taken 5/5/2024 0856 by Gina Nayak, RN  Head of Bed (HOB) Positioning: HOB at 20 degrees

## 2024-05-05 NOTE — DISCHARGE SUMMARY
Orthopedics Discharge Summary                                       JOSEFA CEDEÑO [id number]   Age: 63 year old  PCP: No Ref-Primary, Physician, None 1960     Date of Admission:  5/3/2024  Date of Discharge::  5/5/2024  Discharge Physician:  Mikel Castellano PA-C    Code status:  Full Code    Admission Information:  Admission Diagnosis:  Spinal stenosis [M48.00]  Spondylolisthesis [M43.10]    Post-Operative Day: 2 Days Post-Op     Reason for admission:  The patient was admitted for the following:Procedure(s) (LRB):  LUMBAR 5 - SACRAL 1 ANTERIOR LUMBAR INTERBODY FUSION, LUMBAR 5 - SACRAL 1 BILATERAL DECOMPRESSION AND LUMBAR 5 - SACRAL 1 POSTERIOR SPINAL FUSION WITH O-ARM STEALTH NAVIGATION (Bilateral)  SURGICAL EXPOSURE, ANTERIOR APPROACH, WITH WOUND CLOSURE, FOR LUMBAR SPINE SURGERY, BY GENERAL SURGERY (Bilateral)  BRIEF HOSPITAL COURSE   This 63 year old female underwent the aforementioned procedure.  There were no intraoperative complications and the patient was transferred to the recovery room and later the orthopedic unit in stable condition. Once the patient reached the orthopedic floor our orthopedic pain protocol was implemented along with the following:  Active Problems:    S/P lumbar and lumbosacral fusion by anterior technique      Allergies:  Patient has no known allergies.    Therapy:  physical therapy  Anticoagulation Plan: Mechanical and/or ambulation     Pain Management: pain medication: oxycodone, tylenol, and vistaril  Weight bearing status: Weight bearing as tolerated    Avoid excessive bending/twisting through low back. No lifting >10lbs.              Wear lumbar corset brace when up out of bed.  The patient was followed daily during the inpatient treatment course  Complications:  None  Consultations:  None     Pertinent Labs   Lab Results: personally reviewed.     Recent Labs   Lab Test 05/04/24  0650   HGB 11.2*          Discharge Information:  Condition at discharge:  Stable  Discharge destination:  Discharged to home     Medications at discharge:  Current Discharge Medication List        START taking these medications    Details   hydrOXYzine HCl (ATARAX) 25 MG tablet Take 1 tablet (25 mg) by mouth every 4 hours as needed for other, itching or anxiety (muscle spasms, adjuvent pain)  Qty: 30 tablet, Refills: 1    Associated Diagnoses: S/P lumbar and lumbosacral fusion by anterior technique      methocarbamol (ROBAXIN) 750 MG tablet Take 1 tablet (750 mg) by mouth every 6 hours as needed for muscle spasms  Qty: 30 tablet, Refills: 0    Associated Diagnoses: S/P lumbar and lumbosacral fusion by anterior technique      oxyCODONE (ROXICODONE) 5 MG tablet Take 1-2 tablets (5-10 mg) by mouth every 4 hours as needed for moderate to severe pain  Qty: 40 tablet, Refills: 0    Associated Diagnoses: S/P lumbar and lumbosacral fusion by anterior technique      senna-docusate (SENOKOT-S/PERICOLACE) 8.6-50 MG tablet Take 1 tablet by mouth 2 times daily  Qty: 20 tablet, Refills: 1    Associated Diagnoses: S/P lumbar and lumbosacral fusion by anterior technique           CONTINUE these medications which have NOT CHANGED    Details   acetaminophen (TYLENOL) 500 MG tablet Take 500-1,000 mg by mouth every 6 hours as needed for mild pain      cyanocobalamin (VITAMIN B-12) 500 MCG SUBL sublingual tablet Place 500 mcg under the tongue daily      multivitamin w/minerals (MULTI-VITAMIN) tablet Take 1 tablet by mouth daily      OSCO MAGNESIUM CITRATE OR Take 1 tablet by mouth daily           STOP taking these medications       ibuprofen (ADVIL/MOTRIN) 400 MG tablet Comments:   Reason for Stopping:         naproxen sodium (ANAPROX) 220 MG tablet Comments:   Reason for Stopping:                          Follow-Up Care:  Patient should be seen in office in 14 days by the patient's Orthopedic Surgeon/Physician Assistant.  Call [phone] for appointment or questions.    Follow up with your PCP for  management of chronic medical problems and to evaluate for post op medical complications including constipation, nausea/vomiting, DVT/PE, anemia, changes in blood pressure, fevers/chills, urinary retention and atelectasis/pneumonia.     Mikel Castellano PA-C

## 2024-05-05 NOTE — PLAN OF CARE
Note from RN [id number]      Patient vital signs are at baseline: Yes  Patient able to ambulate as they were prior to admission or with assist devices provided by therapies during their stay:  Yes  Patient MUST void prior to discharge:  Yes  Patient able to tolerate oral intake:  Yes  Pain has adequate pain control using Oral analgesics:  Yes  Does patient have an identified :  Yes  Has goal D/C date and time been discussed with patient:  Yes  Goal Outcome Evaluation:       Pt is alert and oriented X 4.  VSS. CMS intact. Dressing on front and back is C/D/I. CPAP on while sleeping. RAMO drain in place. Ambulates with A X 1, GB, walker. Bed alarm is on for safety.

## 2024-05-05 NOTE — PLAN OF CARE
Problem: Adult Inpatient Plan of Care  Goal: Optimal Comfort and Wellbeing  Outcome: Progressing  Intervention: Monitor Pain and Promote Comfort  Recent Flowsheet Documentation  Taken 5/5/2024 0206 by Miranda Norris RN  Pain Management Interventions: medication (see MAR)   Goal Outcome Evaluation:       Pt is alert and oriented x4. C/o back pain. Oxycodone 5-10mg given with improvement. Ambulates to the bathroom with Assist of 1 and a walker. Dressing intact to back with minimal drainage. Dressing to abdomen is clean dry and intact. RAMO draining sanguinous secretions with 65ml output overnight. VSS. Now in recliner.

## 2024-05-05 NOTE — PROGRESS NOTES
"                  Orthopedics Progress Note      Post-operative Day: 2 Days Post-Op    Procedure(s):  LUMBAR 5 - SACRAL 1 ANTERIOR LUMBAR INTERBODY FUSION, LUMBAR 5 - SACRAL 1 BILATERAL DECOMPRESSION AND LUMBAR 5 - SACRAL 1 POSTERIOR SPINAL FUSION WITH O-ARM STEALTH NAVIGATION  SURGICAL EXPOSURE, ANTERIOR APPROACH, WITH WOUND CLOSURE, FOR LUMBAR SPINE SURGERY, BY GENERAL SURGERY      Subjective:  5/5/2024      Pain: minimal-mod  Chest pain, SOB:  No      Objective:  Blood pressure 116/54, pulse 81, temperature 98.1  F (36.7  C), temperature source Oral, resp. rate 18, height 1.626 m (5' 4\"), weight 104.3 kg (230 lb), SpO2 93%.    Patient Vitals for the past 24 hrs:   BP Temp Temp src Pulse Resp SpO2   05/05/24 0109 116/54 98.1  F (36.7  C) Oral 81 18 93 %   05/04/24 2100 116/58 98.2  F (36.8  C) Oral 76 16 97 %   05/04/24 1837 108/52 -- -- 70 -- --   05/04/24 1633 93/53 97.8  F (36.6  C) Oral 65 17 96 %   05/04/24 0908 102/53 98  F (36.7  C) Oral 59 18 98 %       Wt Readings from Last 4 Encounters:   05/03/24 104.3 kg (230 lb)         Surgical extremity motor function, sensation, and circulation intact: Yes    Post op dressing intact. Yes  Calf tenderness: Bilateral  No    Pertinent Labs   Lab Results: personally reviewed.     Recent Labs   Lab Test 05/04/24  0650   HGB 11.2*       Plan: Anticoagulation protocol: Mechanical and/or ambulation            Pain medications:  pain medication: oxycodone, tylenol, vistaril, methocarbamol was sent for home             Weight bearing status:  WBAT Avoid excessive bending/twisting through low back. No lifting >10lbs.              Wear lumbar corset brace when up out of bed.            Disposition:  home later today if drain output decreased             Continue cares and rehabilitation. Will place RAMO drain to gravity this am     Report completed by:  Mikel Castellano PA-C  Date: 5/5/2024  Time: 8:16 AM  "

## 2024-05-05 NOTE — PROGRESS NOTES
Occupational Therapy Discharge Summary    Reason for therapy discharge:    Discharged to home.    Progress towards therapy goal(s). See goals on Care Plan in [facility] electronic health record for goal details.  Goals met    Therapy recommendation(s):    No further therapy is recommended.

## 2024-05-05 NOTE — DISCHARGE INSTRUCTIONS
Lumbar Spine Surgery Discharge Instructions    Your surgery was: L5-S1 anterior posterior fusion and decompression    Your surgeon is: Po Rowell MD    Restrictions after lumbar surgery:  - You should not do any excessive bending or twisting of your back beyond that needed to get in and out of a bed/chair, a car, or other similar daily activities.    - No lifting greater than 10 lbs (approximately what 1 gallon of milk weighs) until you are instructed that it is okay at your clinic follow-up visit.    - You should not drive a car or operate heavy machinery if you are taking prescribed narcotic pain medication    - Becuase you had a fusion surgery you should avoid taking any non-steroidal anti-inflammatory or NSAID medications (Advil, Aleve, ibuprofin, Motrin diclofenac, etc) until you are instructed that this is okay in clinic follow-up. Usually you should be off of these medications for the first 3 months after fusion    - IWhere your lumbar corset brace whenever up out of bd.    - Wound Care Instructions: Under your operative site dressings there are steri strips (small band-aids that go over the incision site). You can remove your dressing three days after surgery prior to your first time showering. Leave steri strips on until they fall off on their own or until you return to clinic for your post-operative appointment. It is okay to shower and get your wound wet, just do not let the water spray directly on your incision. Do not soak in a bathtub or swimming pool until you are told it is okay to do so when you return to clinic. The sutures are all buried under the skin and will dissolve on their own with time. If there is any drainage from the incision then you should place a new dry gauze dressing over it after a shower. If the incision and steri strips are dry then you can leave it without a dressing.    - Walking is your main physical therapy for now - we generally will not order PT unless it is determined at  a later date in clinic that this is necessary. You should generally try to do at least short walks several times daily.    - If you are prescribed narcotic pain medications (Oxycodone, Norco, Percocet, Tylenol #3, Dilaudid, etc) then you should try to wean off of them as tolerated. These are AS NEEDED medications, so if you are not having significant pain you should try to take fewer pills at a time or spread out the doses out over a longer period of time than is written on the prescription. As an example if you are prescribed 1-2 pills of pain medication every 4 hours AS NEEDED for pain, then you should begin taking only 1 pill every 4 hours as your pain tolerates. Then when 1 pill is giving good pain relief you should try to spread the doses out longer than 4 hours apart as you can tolerate it.    - You should avoid taking any more pain medications than is written on the prescription. In the event that you run out of the pills prior to when you should based on the written instructions, it is likely that your insurance provider will deny paying for a refill early.    - If your pain pill contains Tylenol (i.e. Percocet, Norco, Tylenol #3) and you are also taking additional Tylenol/acetaminophen as a pain medication, ensure that you are not taking too much tylenol. Prescription narcotic medications that contain Tylenol usually have 325mg of Tylenol per pill and over-the-counter medications have variable dose of Tylenol. You should not exceed 4,000mg of Tylenol in a 24-hour period.    Call the office if you have any questions/concerns or are experiencing the following:  - increasing drainage from the incision  - foul-smelling or malodorous drainage from the incision  - increasing pain not controlled by your prescribed medications  - inability to urinate  - new onset of weakness, numbness or severe pain in the extremities  - bowel/bladder incontinence  - Fever greater than 101.5 degrees  - Nausea/vomitting causing  inability to eat food or medications    During normal business hours 7:30AM to 5:00PM on Monday-Friday you can reach my clinical assistant at [phone] and after hours you can reach the call-center for on-call physician at [phone]

## 2024-05-06 LAB — GLUCOSE BLDC GLUCOMTR-MCNC: 118 MG/DL (ref 70–99)

## 2024-05-09 ENCOUNTER — HOSPITAL ENCOUNTER (OUTPATIENT)
Dept: ULTRASOUND IMAGING | Facility: CLINIC | Age: 64
Discharge: HOME | End: 2024-05-09
Admitting: RADIOLOGY
Payer: COMMERCIAL

## 2024-05-09 DIAGNOSIS — M79.605 LEFT LEG PAIN: ICD-10-CM

## 2024-05-09 PROCEDURE — 93971 EXTREMITY STUDY: CPT | Mod: LT

## 2024-07-14 ENCOUNTER — HEALTH MAINTENANCE LETTER (OUTPATIENT)
Age: 64
End: 2024-07-14

## 2025-07-19 ENCOUNTER — HEALTH MAINTENANCE LETTER (OUTPATIENT)
Age: 65
End: 2025-07-19

## (undated) DEVICE — DRAPE SHEET REV FOLD 3/4 9349

## (undated) DEVICE — CUSHION INSERT LG PRONE VIEW JACKSON TABLE

## (undated) DEVICE — BLADE KNIFE SURG 11 371111

## (undated) DEVICE — SPONGE LAP 18X18" X8435

## (undated) DEVICE — DRSG PRIMAPORE 03 1/8X6" 66000318

## (undated) DEVICE — NEEDLE HYPO 18X1-1/2 SAFETY 305918

## (undated) DEVICE — SUTURE VICRYL+ 1 8-27 CT-1/CR VLT VCPP

## (undated) DEVICE — SUTURE VICRYL+ 2-0 27IN CT-1 UND VCP259H

## (undated) DEVICE — SOL NACL 0.9% IRRIG 1000ML BOTTLE 2F7124

## (undated) DEVICE — ESU ELEC BLADE 2.75" COATED/INSULATED E1455

## (undated) DEVICE — CANNULA BN CMNT 5IN 11GA BVL INTRO STRL LF DISP 0306-111-000

## (undated) DEVICE — SUTURE SILK 2-0 TIES 30IN SA85H

## (undated) DEVICE — Device

## (undated) DEVICE — MARKER SPHERES PASSIVE MEDT PACK 5 8801075

## (undated) DEVICE — SU STRATAFIX MONOCRYL 3-0 SPIRAL PS-2 30CM SXMP1B106

## (undated) DEVICE — GLOVE BIOGEL PI INDICATOR 8.0 LF 41680

## (undated) DEVICE — DRESSING MEPILEX ADH 4INX10IN STRL LF 496455

## (undated) DEVICE — GLOVE UNDER INDICATOR PI SZ 7.0 LF 41670

## (undated) DEVICE — ESU PENCIL SMOKE EVAC W/ROCKER SWITCH 0703-047-000

## (undated) DEVICE — SUTURE PDS 0 60IN CTX+ LOOPED PDP990G

## (undated) DEVICE — GLOVE BIOGEL PI SZ 7.5 40875

## (undated) DEVICE — ADH LIQUID MASTISOL TOPICAL VIAL 2-3ML 0523-48

## (undated) DEVICE — NEEDLE SPINAL DISP 18GA X 3.5" QUINCKE 333350

## (undated) DEVICE — RX SURGIFLO HEMOSTATIC MATRIX 8ML 2991

## (undated) DEVICE — CATH TRAY FOLEY SURESTEP 16FR DRAIN BAG STATOCK A899916

## (undated) DEVICE — SOL WATER IRRIG 1000ML BOTTLE 2F7114

## (undated) DEVICE — PACK 9X6IN THRP HOT COLD CMPR  MED GEL 80104

## (undated) DEVICE — CELL SAVER

## (undated) DEVICE — POSITIONER ARM CRADLE LAMINECTOMY DISP

## (undated) DEVICE — ESU LIGASURE MARYLAND JAW OPEN VESSEL 23CM LF1923

## (undated) DEVICE — CUST PACK ANTERIOR POSTERIOR FUSION SOP5BAPHEA

## (undated) DEVICE — DRAPE C-ARM 60X42" 1013

## (undated) DEVICE — TOOL DISSECT MIDAS MR8 14CM MATCH HEAD 3MM MR8-14MH30

## (undated) DEVICE — SU DERMABOND ADVANCED .7ML DNX12

## (undated) DEVICE — SPONGE KITNER DISSECTING 7102*

## (undated) DEVICE — GOWN IMPERVIOUS BREATHABLE SMART XLG 89045

## (undated) DEVICE — GLOVE SURG PI ULTRA TOUCH M SZ 7-1/2 LF

## (undated) DEVICE — DRSG STERI STRIP 1/2X4" R1547

## (undated) DEVICE — SYR 30ML LL W/O NDL 302832

## (undated) DEVICE — DRAPE C-ARMOR 5 SIDED 5523

## (undated) DEVICE — MARKER SPHERES PASSIVE MEDT PACK 1 8801071

## (undated) DEVICE — SUCTION MANIFOLD NEPTUNE 2 SYS 1 PORT 702-025-000

## (undated) DEVICE — ESU ELEC BLADE 6" COATED E1450-6

## (undated) DEVICE — SPONGE NEURO 1/2X1/2 WECK 200100

## (undated) DEVICE — WRAP B-COOL TERI LUMBAR 08143380

## (undated) DEVICE — GLOVE BIOGEL PI ULTRATOUCH G SZ 7.0 42170

## (undated) DEVICE — ELECTRODE PATIENT RETURN ADULT L10 FT 2 PLATE CORD 0855C

## (undated) RX ORDER — PROPOFOL 10 MG/ML
INJECTION, EMULSION INTRAVENOUS
Status: DISPENSED
Start: 2024-05-03

## (undated) RX ORDER — EPHEDRINE SULFATE 50 MG/ML
INJECTION, SOLUTION INTRAMUSCULAR; INTRAVENOUS; SUBCUTANEOUS
Status: DISPENSED
Start: 2024-05-03

## (undated) RX ORDER — GLYCOPYRROLATE 0.2 MG/ML
INJECTION, SOLUTION INTRAMUSCULAR; INTRAVENOUS
Status: DISPENSED
Start: 2024-05-03

## (undated) RX ORDER — LIDOCAINE HYDROCHLORIDE 10 MG/ML
INJECTION, SOLUTION EPIDURAL; INFILTRATION; INTRACAUDAL; PERINEURAL
Status: DISPENSED
Start: 2024-05-03

## (undated) RX ORDER — FENTANYL CITRATE 50 UG/ML
INJECTION, SOLUTION INTRAMUSCULAR; INTRAVENOUS
Status: DISPENSED
Start: 2024-05-03

## (undated) RX ORDER — ONDANSETRON 2 MG/ML
INJECTION INTRAMUSCULAR; INTRAVENOUS
Status: DISPENSED
Start: 2024-05-03

## (undated) RX ORDER — CEFAZOLIN SODIUM 1 G/3ML
INJECTION, POWDER, FOR SOLUTION INTRAMUSCULAR; INTRAVENOUS
Status: DISPENSED
Start: 2024-05-03